# Patient Record
Sex: FEMALE | Race: WHITE | Employment: OTHER | URBAN - METROPOLITAN AREA
[De-identification: names, ages, dates, MRNs, and addresses within clinical notes are randomized per-mention and may not be internally consistent; named-entity substitution may affect disease eponyms.]

---

## 2018-09-27 ENCOUNTER — HOSPITAL ENCOUNTER (OUTPATIENT)
Age: 68
Discharge: HOME | End: 2018-09-29
Payer: MEDICARE

## 2018-09-27 PROCEDURE — 87088 URINE BACTERIA CULTURE: CPT

## 2018-09-27 PROCEDURE — 87186 SC STD MICRODIL/AGAR DIL: CPT

## 2018-09-29 LAB
ORGANISM: ABNORMAL
URINE CULTURE, ROUTINE: ABNORMAL
URINE CULTURE, ROUTINE: ABNORMAL

## 2019-06-21 ENCOUNTER — HOSPITAL ENCOUNTER (OUTPATIENT)
Age: 69
Discharge: HOME | End: 2019-06-23
Payer: MEDICARE

## 2019-06-21 PROCEDURE — 87088 URINE BACTERIA CULTURE: CPT

## 2019-06-21 PROCEDURE — 87186 SC STD MICRODIL/AGAR DIL: CPT

## 2019-06-21 PROCEDURE — 87077 CULTURE AEROBIC IDENTIFY: CPT

## 2019-06-23 LAB
ORGANISM: ABNORMAL
URINE CULTURE, ROUTINE: ABNORMAL
URINE CULTURE, ROUTINE: ABNORMAL

## 2020-01-20 ENCOUNTER — HOSPITAL ENCOUNTER (OUTPATIENT)
Dept: DIABETES SERVICES | Age: 70
Setting detail: THERAPIES SERIES
Discharge: HOME | End: 2020-01-20
Payer: MEDICARE

## 2020-01-20 PROCEDURE — G0109 DIAB MANAGE TRN IND/GROUP: HCPCS | Performed by: DIETITIAN, REGISTERED

## 2020-01-21 ENCOUNTER — HOSPITAL ENCOUNTER (OUTPATIENT)
Dept: DIABETES SERVICES | Age: 70
Setting detail: THERAPIES SERIES
Discharge: HOME | End: 2020-01-21
Payer: MEDICARE

## 2020-01-21 PROCEDURE — G0109 DIAB MANAGE TRN IND/GROUP: HCPCS

## 2020-01-21 NOTE — PROGRESS NOTES
Diabetes Self-Management Education Record    Participant Name: Richard Long  Referring Provider: Mohit Jeffery MD  Assessment/Evaluation Ratings:  1=Needs Instruction   4=Demonstrates Understanding/Competency  2=Needs Review   NC=Not Covered    3=Comprehends Key Points  N/A=Not Applicable  Topics/Learning Objectives Pre-session Assess Date:  1/20/20 PC Instr. Date Reinforce Date Post- session Eval Comments   Diabetes disease process & Treatment process: Define diabetes & prediabetes; Identify own type of diabetes; role of the pancreas; signs/symptoms; diagnostic criteria; prevention & treatment options; contributing factors. 1 1/20/20 PC  3 1/20/20 PC    New onset Type 2 DM       Incorporating nutritional management into lifestyle: Describe effect of type, amount & timing of food on blood glucose; Describe basic meal planning techniques & current nutrition guidelines;Correctly read food labels & demonstrate CHO counting & portion control with personalized meal plan. Identify dining out strategies, & dietary sick day guidelines. Incorporating physical activity into lifestyle:   Verbalize effect of exercise on blood glucose levels; benefits of regular exercise; safety considerations; contraindications; maintenance of activity. 1 1/20/20 PC  3 1/20/20 PC  Walking and yard work    3-5 times a week    30 minute sessions   Using medications safely:  Identify effects of diabetes medicines on blood glucose levels; List diabetes medication taken, action & side effects; appropriate injection sites; proper storage; supplies needed; proper technique; safe needle disposal guidelines. 1 1/20/20 PC  1/21/20 PC  3 1/20/20 PC    Metformin 500 mg with breakfast and supper   Monitoring blood glucose, interpreting and using results:  Identify recommended & personal blood glucose targets; importance of testing; testing supplies; HgbA1C target levels; Factors affecting blood glucose;  Importance of logging blood glucose levels for pattern recognition; ketone testing; safe lancet disposal. 1 1/20/20 PC  3 1/20/20 PC    Not monitoring at this time   Prevention, detection & treatment of acute complications:  Identify symptoms of hyper & hypoglycemia, and prevention & treatment strategies. Describe sick day guidelines & indications for ketone testing & physician notification. Identify short term consequences of poor control. 1 1/20/20 PC  3 1/20/20    Prevention, detection & treatment of chronic complications:  Define the natural course of diabetes & describe the relationship of blood glucose levels to long term complications of diabetes. Identify preventative measures & standards of care. 1 1/20/20 PC  3 1/20/20 PC    Hypertension   Developing strategies to address psychosocial issues:  Describe feelings about living with diabetes; Describe how stress, depression & anxiety affect blood glucose; Identify coping strategies; Identify support needed & support network available. 1 1/20/20 PC  3 PHQ-9 Depression Screen Score: 2     Developing strategies to promote health/change behavior: Identify 7 self-care behaviors; Personal health risk factors; Benefits, challenges & strategies for behavioral change; Individualized goal selection.  1 1/20/20 PC  3 Goal:I will follow my meal plan and measure my carbohydrate foods     Identified Barriers to learning/adherence to self management plan:    None    Instruction Method:  Lecture/Discussion, Power Point Presentation , Handouts and Return demonstration     Education Materials/Equipment Provided: Educational Binder, Meal Plan and Nutritional Packet       Encounter Type Date Attended Start Time End Time Comments No Show Dates   Assessment 1/20/20 PC     Reviewed in person    Session 1 1/20/20 PC        Session 2 1/21/20 PC       Session 3         Session 4         Gestational Diabetes         Individual MNT        Meter Instrx        Insulin Instrx            Additional Comments:    JAYLEN Follow-up plan/Date: 4/2020  Contact Post Class Regarding:   HgbA1C   Weight   Hypertension  Follow-up with Physician  Medication compliance   Plate method/meal plan compliance   Self-Foot Exam Frequency   Monitoring Frequency   Exercise Routine   Goal Attainment  Personal Support Plan:      [x] Keep all scheduled doctor appointments   [] Make and keep appointments with specialists (foot, eye, dentist) as recommended   [] Consult my pharmacist about all new medications or to ask any medication questions   [] Get tested for sleep apnea   [] Seek help for:   [] Make an appointment with:   [] Attend smoking cessation classes or call 1-800-QUIT-NOW  [] Attend Diabetes Support Group   [x] Use diabetes magazines, books, or credible web-sites like the ADA for more information  [] Increase exercise at home or join an exercise program:   [] Other:

## 2020-01-21 NOTE — LETTER
[facility])- Diabetes Education    2020       Re:     Henrietta Oliveira         :  1950  Dear Chaim Mcneil: Thank you for referring your patient, Henrietta Oliveira, for diabetes education sessions.     Your patient attended classes the week of 20, that addressed the following topics:    Nursing/Medical [x]     Nutrition [x]  · Describing the diabetes disease process/ treatment options  · Incorporating physical activity into lifestyle  · Using medication safely & for maximum therapeutic effectiveness  · Monitoring blood glucose & other parameters:  Interpreting and using results for self-management & decision making  · Preventing, detecting, & treating acute complications  · Preventing, detecting & treating chronic complications  · Developing personal strategies to address psychosocial issues and concerns  · Developing personal strategies to promote health & behavior change (risk reduction) · Incorporating nutrition management into lifestyle    · Nutrition for diabetes prevention & diabetes  · Relationship among nutrition, exercise, medication & blood glucose levels  · Food Groups/carbohydrate & non- carbohydrate foods  · Whole grain/high fiber foods & needs  · Fluid needs   · Label reading  · Carbohydrate consistent meal planning/ techniques  · Weighing/measuring portions  · Heart healthy guidelines: fat, sodium & cholesterol  · Alcohol  · Free foods/nutritive and non-nutritive sweeteners  · Dining out tips and recipe modification guidelines  · Sick day guidelines     [x]  Instructed on carbohydrate consistent meal plan with  1400 calories, and the following number of carbohydrate servings per meal or snack (15 gm/serving):   Breakfast:  2,  Lunch: 2, Dinner:  2     AM Snack:  0,  PM Snack:  1,  HS Snack:  2      Upon completion of these sessions the diabetes team made the following evaluation of your patients progress:  [x] Attended class alone [] Attended classes accompanied by       family/friend/significant other  [x] Very attentive to teaching  [x] Actively participated in class               discussions   [x] Answered questions appropriately       when asked   [x] Seems able to apply class concepts   to daily lifestyle  [] Had difficulty relating class information to daily lifestyle  [x] Seems motivated to do well [x] Participated in [place] nutrition              session  [x] Able to identify proper food choices      and diet changes  [] Unable to identify proper food choices  [x] Verbalizes an understanding of meal       plan  [x] Expresses an intent to comply with            meal plan  [] Refused to participate in  [place]         nutrition session  [] Worked out meal timing adjustment            according to work/schedule/lifestyle     PHQ-9 Depression Screen Score:  2  0-4: Minimal Depression                5-9: Mild Depression    10-14: Moderate Depression  15-19: Moderately Severe Depression  20-27: Severe Depression     COMMENTS:      PATIENT SELECTED GOAL:I will follow my meal plan and measure my carbohydate foods. DIABETES SELF-MANAGEMENT SUPPORT PLAN/REFERRALS  (patient identified):  [x] Keep my scheduled visits with my doctor   [] Make and keep appointments with specialists (foot, eye, dentist) as recommended  [] Consult my pharmacist with all new medications and/or any medication questions  [] Get tested for sleep apnea  [] Seek help for:   [] Make an appointment with:   [] Attend smoking cessation classes or call help-line (2-581-QUIT-NOW; 981.455.9223)  [] Attend a diabetes support group  [x] Use diabetes magazines, books, or the American Diabetes Association website (www.diabetes. org) for more information    [] Start or increase exercising at home or join a program:  [] Other: There will be a follow-up in 3 months to evaluate A1C, carbohydrate recall, attainment of their chosen goal, and self identified support plan.

## 2020-01-21 NOTE — PROGRESS NOTES
Diabetes Self-Management Education Record    Participant Name: Marjorie Preciado  Referring Provider: Marilu Hinojosa MD  Assessment/Evaluation Ratings:  1=Needs Instruction   4=Demonstrates Understanding/Competency  2=Needs Review   NC=Not Covered    3=Comprehends Key Points  N/A=Not Applicable  Topics/Learning Objectives Pre-session Assess Date:  1/20/20 PC Instr. Date Reinforce Date Post- session Eval Comments   Diabetes disease process & Treatment process: Define diabetes & prediabetes; Identify own type of diabetes; role of the pancreas; signs/symptoms; diagnostic criteria; prevention & treatment options; contributing factors. 1 1/20/20 PC  3 1/20/20 PC    New onset Type 2 DM       Incorporating nutritional management into lifestyle: Describe effect of type, amount & timing of food on blood glucose; Describe basic meal planning techniques & current nutrition guidelines;Correctly read food labels & demonstrate CHO counting & portion control with personalized meal plan. Identify dining out strategies, & dietary sick day guidelines. Incorporating physical activity into lifestyle:   Verbalize effect of exercise on blood glucose levels; benefits of regular exercise; safety considerations; contraindications; maintenance of activity. 1 1/20/20 PC  3 1/20/20 PC  Walking and yard work    3-5 times a week    30 minute sessions   Using medications safely:  Identify effects of diabetes medicines on blood glucose levels; List diabetes medication taken, action & side effects; appropriate injection sites; proper storage; supplies needed; proper technique; safe needle disposal guidelines. 1 1/20/20 PC  1/21/20 PC  3 1/20/20 PC    Metformin 500 mg with breakfast and supper   Monitoring blood glucose, interpreting and using results:  Identify recommended & personal blood glucose targets; importance of testing; testing supplies; HgbA1C target levels; Factors affecting blood glucose;  Importance of logging blood glucose levels for pattern recognition; ketone testing; safe lancet disposal. 1 1/20/20 PC  3 1/20/20 PC    Not monitoring at this time   Prevention, detection & treatment of acute complications:  Identify symptoms of hyper & hypoglycemia, and prevention & treatment strategies. Describe sick day guidelines & indications for ketone testing & physician notification. Identify short term consequences of poor control. 1 1/20/20 PC  3 1/20/20    Prevention, detection & treatment of chronic complications:  Define the natural course of diabetes & describe the relationship of blood glucose levels to long term complications of diabetes. Identify preventative measures & standards of care. 1 1/20/20 PC  3 1/20/20 PC    Hypertension   Developing strategies to address psychosocial issues:  Describe feelings about living with diabetes; Describe how stress, depression & anxiety affect blood glucose; Identify coping strategies; Identify support needed & support network available. 1 1/20/20 PC  3 PHQ-9 Depression Screen Score: 2     Developing strategies to promote health/change behavior: Identify 7 self-care behaviors; Personal health risk factors; Benefits, challenges & strategies for behavioral change; Individualized goal selection.  1 1/20/20 PC  3 Goal:I will follow my meal plan and measure my carbohydrate foods     Identified Barriers to learning/adherence to self management plan:    None    Instruction Method:  Lecture/Discussion, Power Point Presentation , Handouts and Return demonstration     Education Materials/Equipment Provided: Educational Binder, Meal Plan and Nutritional Packet       Encounter Type Date Attended Start Time End Time Comments No Show Dates   Assessment 1/20/20 PC     Reviewed in person    Session 1 1/20/20 PC        Session 2 1/21/20 PC       Session 3         Session 4         Gestational Diabetes         Individual MNT        Meter Instrx        Insulin Instrx            Additional Comments:    JAYLEN Follow-up plan/Date: 4/2020  Contact Post Class Regarding:   HgbA1C   Weight   Hypertension  Follow-up with Physician  Medication compliance   Plate method/meal plan compliance   Self-Foot Exam Frequency   Monitoring Frequency   Exercise Routine   Goal Attainment  Personal Support Plan:      [x] Keep all scheduled doctor appointments   [] Make and keep appointments with specialists (foot, eye, dentist) as recommended   [] Consult my pharmacist about all new medications or to ask any medication questions   [] Get tested for sleep apnea   [] Seek help for:   [] Make an appointment with:   [] Attend smoking cessation classes or call 1-800-QUIT-NOW  [] Attend Diabetes Support Group   [x] Use diabetes magazines, books, or credible web-sites like the ADA for more information  [] Increase exercise at home or join an exercise program:   [] Other:

## 2020-01-22 ENCOUNTER — HOSPITAL ENCOUNTER (OUTPATIENT)
Dept: DIABETES SERVICES | Age: 70
Setting detail: THERAPIES SERIES
Discharge: HOME | End: 2020-01-22
Payer: MEDICARE

## 2020-01-22 PROCEDURE — G0109 DIAB MANAGE TRN IND/GROUP: HCPCS

## 2020-01-22 NOTE — PROGRESS NOTES
Barriers to learning/adherence to self management plan:    None    Instruction Method:  Lecture/Discussion, Power Point Presentation , Handouts and Return demonstration     Education Materials/Equipment Provided: Educational Binder, Meal Plan and Nutritional Packet       Encounter Type Date Attended Start Time End Time Comments No Show Dates   Assessment 1/20/20 PC     Reviewed in person    Session 1 1/20/20 PC        Session 2 1/21/20 PC/ 900 36     Session 3         Session 4         Gestational Diabetes         Individual MNT        Meter Instrx        Insulin Instrx            Additional Comments:    [facility] Follow-up plan/Date: 4/2020  Contact Post Class Regarding:   HgbA1C   Weight   Hypertension  Follow-up with Physician  Medication compliance   Plate method/meal plan compliance   Self-Foot Exam Frequency   Monitoring Frequency   Exercise Routine   Goal Attainment  Personal Support Plan:      [x] Keep all scheduled doctor appointments   [] Make and keep appointments with specialists (foot, eye, dentist) as recommended   [] Consult my pharmacist about all new medications or to ask any medication questions   [] Get tested for sleep apnea   [] Seek help for:   [] Make an appointment with:   [] Attend smoking cessation classes or call 1-800-QUIT-NOW  [] Attend Diabetes Support Group   [x] Use diabetes magazines, books, or credible web-sites like the ADA for more information  [] Increase exercise at home or join an exercise program:   [] Other:

## 2020-01-23 NOTE — PROGRESS NOTES
1/20/20 PC            1/21/20 [facility]  3            3 1/20/20   Walking and yard work    3-5 times a week    30 minute sessions  1/21/20 Motion recommendations reviewed as well as handouts provided for different types of exercise. Barriers to exercise addressed with group discussion on ways to \"break the barrier\" of not including more motion into day. Using medications safely:  Identify effects of diabetes medicines on blood glucose levels; List diabetes medication taken, action & side effects; appropriate injection sites; proper storage; supplies needed; proper technique; safe needle disposal guidelines. 1 1/20/20 PC  1/21/20 PC  3 1/20/20     Metformin 500 mg with breakfast and supper   Monitoring blood glucose, interpreting and using results:  Identify recommended & personal blood glucose targets; importance of testing; testing supplies; HgbA1C target levels; Factors affecting blood glucose; Importance of logging blood glucose levels for pattern recognition; ketone testing; safe lancet disposal. 1 1/20/20 PC  3 1/20/20 PC    Not monitoring at this time   Prevention, detection & treatment of acute complications:  Identify symptoms of hyper & hypoglycemia, and prevention & treatment strategies. Describe sick day guidelines & indications for ketone testing & physician notification. Identify short term consequences of poor control. 1 1/20/20 PC  3 1/20/20    Prevention, detection & treatment of chronic complications:  Define the natural course of diabetes & describe the relationship of blood glucose levels to long term complications of diabetes. Identify preventative measures & standards of care. 1 1/20/20 PC  3 1/20/20     Hypertension   Developing strategies to address psychosocial issues:  Describe feelings about living with diabetes; Describe how stress, depression & anxiety affect blood glucose; Identify coping strategies; Identify support needed & support network available.  1 1/20/20 PC  3 PHQ-9

## 2020-10-06 ENCOUNTER — HOSPITAL ENCOUNTER (OUTPATIENT)
Age: 70
Discharge: HOME | End: 2020-10-06
Payer: MEDICARE

## 2020-10-06 LAB — POTASSIUM SERPL-SCNC: 4.2 MMOL/L (ref 3.5–5)

## 2020-10-06 PROCEDURE — 84132 ASSAY OF SERUM POTASSIUM: CPT

## 2020-10-06 PROCEDURE — 36415 COLL VENOUS BLD VENIPUNCTURE: CPT

## 2020-10-20 ENCOUNTER — HOSPITAL ENCOUNTER (OUTPATIENT)
Age: 70
Discharge: HOME | End: 2020-10-22
Payer: COMMERCIAL

## 2020-10-20 PROCEDURE — 87077 CULTURE AEROBIC IDENTIFY: CPT

## 2020-10-20 PROCEDURE — 87186 SC STD MICRODIL/AGAR DIL: CPT

## 2020-10-20 PROCEDURE — 87088 URINE BACTERIA CULTURE: CPT

## 2020-10-22 LAB
ORGANISM: ABNORMAL
URINE CULTURE, ROUTINE: ABNORMAL

## 2022-08-22 ENCOUNTER — HOSPITAL ENCOUNTER (INPATIENT)
Age: 72
LOS: 3 days | Discharge: HOME | DRG: 854 | End: 2022-08-25
Attending: STUDENT IN AN ORGANIZED HEALTH CARE EDUCATION/TRAINING PROGRAM | Admitting: STUDENT IN AN ORGANIZED HEALTH CARE EDUCATION/TRAINING PROGRAM
Payer: MEDICARE

## 2022-08-22 ENCOUNTER — APPOINTMENT (OUTPATIENT)
Dept: CT IMAGING | Age: 72
DRG: 854 | End: 2022-08-22
Payer: MEDICARE

## 2022-08-22 DIAGNOSIS — N13.30 HYDRONEPHROSIS, UNSPECIFIED HYDRONEPHROSIS TYPE: ICD-10-CM

## 2022-08-22 DIAGNOSIS — N20.1 URETEROLITHIASIS: ICD-10-CM

## 2022-08-22 DIAGNOSIS — N30.01 ACUTE CYSTITIS WITH HEMATURIA: ICD-10-CM

## 2022-08-22 DIAGNOSIS — N17.9 AKI (ACUTE KIDNEY INJURY) (HCC): ICD-10-CM

## 2022-08-22 DIAGNOSIS — R74.01 TRANSAMINITIS: Primary | ICD-10-CM

## 2022-08-22 DIAGNOSIS — R10.9 RIGHT FLANK PAIN: ICD-10-CM

## 2022-08-22 DIAGNOSIS — D72.829 LEUKOCYTOSIS, UNSPECIFIED TYPE: ICD-10-CM

## 2022-08-22 LAB
ALBUMIN SERPL-MCNC: 3.7 G/DL (ref 3.5–5.2)
ALP BLD-CCNC: 132 U/L (ref 35–104)
ALT SERPL-CCNC: 17 U/L (ref 0–32)
ANION GAP SERPL CALCULATED.3IONS-SCNC: 15 MMOL/L (ref 7–16)
AST SERPL-CCNC: 19 U/L (ref 0–31)
BACTERIA: ABNORMAL /HPF
BASOPHILS ABSOLUTE: 0.03 E9/L (ref 0–0.2)
BASOPHILS RELATIVE PERCENT: 0.2 % (ref 0–2)
BILIRUB SERPL-MCNC: 2.1 MG/DL (ref 0–1.2)
BILIRUBIN URINE: NEGATIVE
BLOOD, URINE: ABNORMAL
BUN BLDV-MCNC: 23 MG/DL (ref 6–23)
CALCIUM SERPL-MCNC: 9.8 MG/DL (ref 8.6–10.2)
CHLORIDE BLD-SCNC: 94 MMOL/L (ref 98–107)
CLARITY: ABNORMAL
CO2: 21 MMOL/L (ref 22–29)
COLOR: YELLOW
CREAT SERPL-MCNC: 1.4 MG/DL (ref 0.5–1)
EOSINOPHILS ABSOLUTE: 0.03 E9/L (ref 0.05–0.5)
EOSINOPHILS RELATIVE PERCENT: 0.2 % (ref 0–6)
EPITHELIAL CELLS, UA: ABNORMAL /HPF
GFR AFRICAN AMERICAN: 45
GFR NON-AFRICAN AMERICAN: 37 ML/MIN/1.73
GLUCOSE BLD-MCNC: 170 MG/DL (ref 74–99)
GLUCOSE URINE: NEGATIVE MG/DL
HCT VFR BLD CALC: 38.5 % (ref 34–48)
HEMOGLOBIN: 12.6 G/DL (ref 11.5–15.5)
IMMATURE GRANULOCYTES #: 0.18 E9/L
IMMATURE GRANULOCYTES %: 1 % (ref 0–5)
KETONES, URINE: 40 MG/DL
LACTIC ACID: 1 MMOL/L (ref 0.5–2.2)
LEUKOCYTE ESTERASE, URINE: ABNORMAL
LYMPHOCYTES ABSOLUTE: 1.56 E9/L (ref 1.5–4)
LYMPHOCYTES RELATIVE PERCENT: 8.4 % (ref 20–42)
MCH RBC QN AUTO: 30.3 PG (ref 26–35)
MCHC RBC AUTO-ENTMCNC: 32.7 % (ref 32–34.5)
MCV RBC AUTO: 92.5 FL (ref 80–99.9)
MONOCYTES ABSOLUTE: 1.03 E9/L (ref 0.1–0.95)
MONOCYTES RELATIVE PERCENT: 5.5 % (ref 2–12)
NEUTROPHILS ABSOLUTE: 15.79 E9/L (ref 1.8–7.3)
NEUTROPHILS RELATIVE PERCENT: 84.7 % (ref 43–80)
NITRITE, URINE: POSITIVE
PDW BLD-RTO: 12.5 FL (ref 11.5–15)
PH UA: 6 (ref 5–9)
PLATELET # BLD: 407 E9/L (ref 130–450)
PMV BLD AUTO: 10.2 FL (ref 7–12)
POTASSIUM SERPL-SCNC: 3.9 MMOL/L (ref 3.5–5)
PROTEIN UA: 100 MG/DL
RBC # BLD: 4.16 E12/L (ref 3.5–5.5)
RBC UA: ABNORMAL /HPF (ref 0–2)
SODIUM BLD-SCNC: 130 MMOL/L (ref 132–146)
SPECIFIC GRAVITY UA: 1.02 (ref 1–1.03)
TOTAL PROTEIN: 8.3 G/DL (ref 6.4–8.3)
UROBILINOGEN, URINE: 0.2 E.U./DL
WBC # BLD: 18.6 E9/L (ref 4.5–11.5)
WBC UA: >20 /HPF (ref 0–5)

## 2022-08-22 PROCEDURE — 96374 THER/PROPH/DIAG INJ IV PUSH: CPT

## 2022-08-22 PROCEDURE — 99285 EMERGENCY DEPT VISIT HI MDM: CPT

## 2022-08-22 PROCEDURE — 74176 CT ABD & PELVIS W/O CONTRAST: CPT

## 2022-08-22 PROCEDURE — 36415 COLL VENOUS BLD VENIPUNCTURE: CPT

## 2022-08-22 PROCEDURE — 80053 COMPREHEN METABOLIC PANEL: CPT

## 2022-08-22 PROCEDURE — 6360000002 HC RX W HCPCS: Performed by: STUDENT IN AN ORGANIZED HEALTH CARE EDUCATION/TRAINING PROGRAM

## 2022-08-22 PROCEDURE — 2580000003 HC RX 258: Performed by: STUDENT IN AN ORGANIZED HEALTH CARE EDUCATION/TRAINING PROGRAM

## 2022-08-22 PROCEDURE — 81001 URINALYSIS AUTO W/SCOPE: CPT

## 2022-08-22 PROCEDURE — 87186 SC STD MICRODIL/AGAR DIL: CPT

## 2022-08-22 PROCEDURE — 87040 BLOOD CULTURE FOR BACTERIA: CPT

## 2022-08-22 PROCEDURE — 87088 URINE BACTERIA CULTURE: CPT

## 2022-08-22 PROCEDURE — 85025 COMPLETE CBC W/AUTO DIFF WBC: CPT

## 2022-08-22 PROCEDURE — 83605 ASSAY OF LACTIC ACID: CPT

## 2022-08-22 PROCEDURE — 87077 CULTURE AEROBIC IDENTIFY: CPT

## 2022-08-22 PROCEDURE — 96375 TX/PRO/DX INJ NEW DRUG ADDON: CPT

## 2022-08-22 PROCEDURE — 2580000003 HC RX 258

## 2022-08-22 PROCEDURE — 1200000000 HC SEMI PRIVATE

## 2022-08-22 RX ORDER — SODIUM CHLORIDE 9 MG/ML
INJECTION, SOLUTION INTRAVENOUS PRN
Status: DISCONTINUED | OUTPATIENT
Start: 2022-08-22 | End: 2022-08-25 | Stop reason: HOSPADM

## 2022-08-22 RX ORDER — SODIUM CHLORIDE 0.9 % (FLUSH) 0.9 %
10 SYRINGE (ML) INJECTION EVERY 12 HOURS SCHEDULED
Status: DISCONTINUED | OUTPATIENT
Start: 2022-08-22 | End: 2022-08-25 | Stop reason: HOSPADM

## 2022-08-22 RX ORDER — ACETAMINOPHEN 325 MG/1
650 TABLET ORAL EVERY 6 HOURS PRN
Status: DISCONTINUED | OUTPATIENT
Start: 2022-08-22 | End: 2022-08-25 | Stop reason: HOSPADM

## 2022-08-22 RX ORDER — HYDRALAZINE HYDROCHLORIDE 20 MG/ML
10 INJECTION INTRAMUSCULAR; INTRAVENOUS EVERY 4 HOURS PRN
Status: DISCONTINUED | OUTPATIENT
Start: 2022-08-22 | End: 2022-08-25 | Stop reason: HOSPADM

## 2022-08-22 RX ORDER — SENNA PLUS 8.6 MG/1
1 TABLET ORAL DAILY PRN
Status: DISCONTINUED | OUTPATIENT
Start: 2022-08-22 | End: 2022-08-25 | Stop reason: HOSPADM

## 2022-08-22 RX ORDER — LANOLIN ALCOHOL/MO/W.PET/CERES
3 CREAM (GRAM) TOPICAL NIGHTLY PRN
Status: DISCONTINUED | OUTPATIENT
Start: 2022-08-23 | End: 2022-08-25 | Stop reason: HOSPADM

## 2022-08-22 RX ORDER — AMLODIPINE BESYLATE 5 MG/1
5 TABLET ORAL DAILY
Status: DISCONTINUED | OUTPATIENT
Start: 2022-08-23 | End: 2022-08-25 | Stop reason: HOSPADM

## 2022-08-22 RX ORDER — SODIUM CHLORIDE 0.9 % (FLUSH) 0.9 %
10 SYRINGE (ML) INJECTION PRN
Status: DISCONTINUED | OUTPATIENT
Start: 2022-08-22 | End: 2022-08-25 | Stop reason: HOSPADM

## 2022-08-22 RX ORDER — ONDANSETRON 4 MG/1
4 TABLET, ORALLY DISINTEGRATING ORAL EVERY 8 HOURS PRN
Status: DISCONTINUED | OUTPATIENT
Start: 2022-08-22 | End: 2022-08-25 | Stop reason: HOSPADM

## 2022-08-22 RX ORDER — 0.9 % SODIUM CHLORIDE 0.9 %
1000 INTRAVENOUS SOLUTION INTRAVENOUS ONCE
Status: COMPLETED | OUTPATIENT
Start: 2022-08-22 | End: 2022-08-22

## 2022-08-22 RX ORDER — ENOXAPARIN SODIUM 100 MG/ML
40 INJECTION SUBCUTANEOUS DAILY
Status: DISCONTINUED | OUTPATIENT
Start: 2022-08-23 | End: 2022-08-25 | Stop reason: HOSPADM

## 2022-08-22 RX ORDER — ACETAMINOPHEN 650 MG/1
650 SUPPOSITORY RECTAL EVERY 6 HOURS PRN
Status: DISCONTINUED | OUTPATIENT
Start: 2022-08-22 | End: 2022-08-25 | Stop reason: HOSPADM

## 2022-08-22 RX ORDER — TRAMADOL HYDROCHLORIDE 50 MG/1
50 TABLET ORAL EVERY 6 HOURS PRN
Status: DISCONTINUED | OUTPATIENT
Start: 2022-08-22 | End: 2022-08-25 | Stop reason: HOSPADM

## 2022-08-22 RX ORDER — SODIUM CHLORIDE 9 MG/ML
INJECTION, SOLUTION INTRAVENOUS CONTINUOUS
Status: ACTIVE | OUTPATIENT
Start: 2022-08-22 | End: 2022-08-23

## 2022-08-22 RX ORDER — 0.9 % SODIUM CHLORIDE 0.9 %
1000 INTRAVENOUS SOLUTION INTRAVENOUS ONCE
Status: COMPLETED | OUTPATIENT
Start: 2022-08-22 | End: 2022-08-23

## 2022-08-22 RX ORDER — FENTANYL CITRATE 50 UG/ML
50 INJECTION, SOLUTION INTRAMUSCULAR; INTRAVENOUS
Status: ACTIVE | OUTPATIENT
Start: 2022-08-22 | End: 2022-08-23

## 2022-08-22 RX ORDER — ONDANSETRON 2 MG/ML
4 INJECTION INTRAMUSCULAR; INTRAVENOUS ONCE
Status: COMPLETED | OUTPATIENT
Start: 2022-08-22 | End: 2022-08-22

## 2022-08-22 RX ORDER — KETOROLAC TROMETHAMINE 30 MG/ML
15 INJECTION, SOLUTION INTRAMUSCULAR; INTRAVENOUS ONCE
Status: COMPLETED | OUTPATIENT
Start: 2022-08-22 | End: 2022-08-22

## 2022-08-22 RX ORDER — ONDANSETRON 2 MG/ML
4 INJECTION INTRAMUSCULAR; INTRAVENOUS EVERY 6 HOURS PRN
Status: DISCONTINUED | OUTPATIENT
Start: 2022-08-22 | End: 2022-08-25 | Stop reason: HOSPADM

## 2022-08-22 RX ADMIN — SODIUM CHLORIDE 1000 ML: 9 INJECTION, SOLUTION INTRAVENOUS at 22:53

## 2022-08-22 RX ADMIN — SODIUM CHLORIDE 1000 ML: 9 INJECTION, SOLUTION INTRAVENOUS at 23:23

## 2022-08-22 RX ADMIN — ONDANSETRON 4 MG: 2 INJECTION INTRAMUSCULAR; INTRAVENOUS at 22:54

## 2022-08-22 RX ADMIN — CEFTRIAXONE 1000 MG: 1 INJECTION, POWDER, FOR SOLUTION INTRAMUSCULAR; INTRAVENOUS at 23:23

## 2022-08-22 RX ADMIN — KETOROLAC TROMETHAMINE 15 MG: 30 INJECTION, SOLUTION INTRAMUSCULAR; INTRAVENOUS at 22:54

## 2022-08-22 ASSESSMENT — PAIN DESCRIPTION - LOCATION: LOCATION: FLANK

## 2022-08-22 ASSESSMENT — PAIN DESCRIPTION - PAIN TYPE: TYPE: ACUTE PAIN

## 2022-08-22 ASSESSMENT — PAIN - FUNCTIONAL ASSESSMENT: PAIN_FUNCTIONAL_ASSESSMENT: 0-10

## 2022-08-22 ASSESSMENT — PAIN SCALES - GENERAL: PAINLEVEL_OUTOF10: 5

## 2022-08-22 ASSESSMENT — PAIN DESCRIPTION - FREQUENCY: FREQUENCY: CONTINUOUS

## 2022-08-22 NOTE — ED NOTES
Department of Emergency Medicine  FIRST PROVIDER TRIAGE NOTE             Independent MLP           8/22/22  7:11 PM EDT    Date of Encounter: 8/22/22   MRN: [id number]      HPI: Sasha Gomez is a 70 y.o. female who presents to the ED for Other (Sent in from [address] for abnormal US, + kidney stones ) and Flank Pain     Pt presenting with right flank pain, chills intermittent for the past few days. Went to urgent care and sent for abnormal US. ROS: Negative for cp or sob. PE: Gen Appearance/Constitutional: alert  HEENT: NC/NT. PERRLA,  Airway patent. Initial Plan of Care: All treatment areas with department are currently occupied. Plan to order/Initiate the following while awaiting opening in ED: labs.   Initiate Treatment-Testing, Proceed toTreatment Area When Bed Available for ED Attending/MLP to Continue Care    Electronically signed by Talha Pathak PA-C   DD: 8/22/22      Talha Pathak PA-C  08/22/22 [id number]

## 2022-08-23 ENCOUNTER — ANESTHESIA (OUTPATIENT)
Dept: OPERATING ROOM | Age: 72
DRG: 854 | End: 2022-08-23
Payer: MEDICARE

## 2022-08-23 ENCOUNTER — APPOINTMENT (OUTPATIENT)
Dept: GENERAL RADIOLOGY | Age: 72
DRG: 854 | End: 2022-08-23
Payer: MEDICARE

## 2022-08-23 ENCOUNTER — ANESTHESIA EVENT (OUTPATIENT)
Dept: OPERATING ROOM | Age: 72
DRG: 854 | End: 2022-08-23
Payer: MEDICARE

## 2022-08-23 LAB
ALBUMIN SERPL-MCNC: 2.7 G/DL (ref 3.5–5.2)
ALP BLD-CCNC: 154 U/L (ref 35–104)
ALT SERPL-CCNC: 15 U/L (ref 0–32)
ANION GAP SERPL CALCULATED.3IONS-SCNC: 10 MMOL/L (ref 7–16)
AST SERPL-CCNC: 31 U/L (ref 0–31)
BASOPHILS ABSOLUTE: 0.04 E9/L (ref 0–0.2)
BASOPHILS RELATIVE PERCENT: 0.3 % (ref 0–2)
BILIRUB SERPL-MCNC: 0.7 MG/DL (ref 0–1.2)
BUN BLDV-MCNC: 22 MG/DL (ref 6–23)
C-REACTIVE PROTEIN: 27 MG/DL (ref 0–0.4)
CALCIUM SERPL-MCNC: 8.4 MG/DL (ref 8.6–10.2)
CHLORIDE BLD-SCNC: 102 MMOL/L (ref 98–107)
CO2: 22 MMOL/L (ref 22–29)
CREAT SERPL-MCNC: 1.3 MG/DL (ref 0.5–1)
CREATININE URINE: 126 MG/DL (ref 29–226)
CREATININE URINE: 130 MG/DL (ref 29–226)
EOSINOPHILS ABSOLUTE: 0.08 E9/L (ref 0.05–0.5)
EOSINOPHILS RELATIVE PERCENT: 0.5 % (ref 0–6)
GFR AFRICAN AMERICAN: 49
GFR NON-AFRICAN AMERICAN: 40 ML/MIN/1.73
GLUCOSE BLD-MCNC: 154 MG/DL (ref 74–99)
HCT VFR BLD CALC: 32.7 % (ref 34–48)
HEMOGLOBIN: 10.4 G/DL (ref 11.5–15.5)
IMMATURE GRANULOCYTES #: 0.12 E9/L
IMMATURE GRANULOCYTES %: 0.8 % (ref 0–5)
LYMPHOCYTES ABSOLUTE: 1.52 E9/L (ref 1.5–4)
LYMPHOCYTES RELATIVE PERCENT: 10.3 % (ref 20–42)
MCH RBC QN AUTO: 30.1 PG (ref 26–35)
MCHC RBC AUTO-ENTMCNC: 31.8 % (ref 32–34.5)
MCV RBC AUTO: 94.8 FL (ref 80–99.9)
METER GLUCOSE: 109 MG/DL (ref 74–99)
METER GLUCOSE: 188 MG/DL (ref 74–99)
MONOCYTES ABSOLUTE: 1.01 E9/L (ref 0.1–0.95)
MONOCYTES RELATIVE PERCENT: 6.8 % (ref 2–12)
NEUTROPHILS ABSOLUTE: 11.98 E9/L (ref 1.8–7.3)
NEUTROPHILS RELATIVE PERCENT: 81.3 % (ref 43–80)
PDW BLD-RTO: 12.5 FL (ref 11.5–15)
PLATELET # BLD: 304 E9/L (ref 130–450)
PMV BLD AUTO: 10.2 FL (ref 7–12)
POTASSIUM REFLEX MAGNESIUM: 4.5 MMOL/L (ref 3.5–5)
PROCALCITONIN: 4.19 NG/ML (ref 0–0.08)
PROTEIN PROTEIN: 88 MG/DL (ref 0–12)
PROTEIN/CREAT RATIO: 0.7
PROTEIN/CREAT RATIO: 0.7 (ref 0–0.2)
RBC # BLD: 3.45 E12/L (ref 3.5–5.5)
SEDIMENTATION RATE, ERYTHROCYTE: 123 MM/HR (ref 0–20)
SODIUM BLD-SCNC: 134 MMOL/L (ref 132–146)
SODIUM URINE: 50 MMOL/L
TOTAL PROTEIN: 6.7 G/DL (ref 6.4–8.3)
UREA NITROGEN, UR: 688 MG/DL (ref 800–1666)
WBC # BLD: 14.8 E9/L (ref 4.5–11.5)

## 2022-08-23 PROCEDURE — 3700000000 HC ANESTHESIA ATTENDED CARE: Performed by: UROLOGY

## 2022-08-23 PROCEDURE — 87088 URINE BACTERIA CULTURE: CPT

## 2022-08-23 PROCEDURE — 7100000010 HC PHASE II RECOVERY - FIRST 15 MIN: Performed by: UROLOGY

## 2022-08-23 PROCEDURE — 6370000000 HC RX 637 (ALT 250 FOR IP): Performed by: UROLOGY

## 2022-08-23 PROCEDURE — 7100000011 HC PHASE II RECOVERY - ADDTL 15 MIN: Performed by: UROLOGY

## 2022-08-23 PROCEDURE — 1200000000 HC SEMI PRIVATE

## 2022-08-23 PROCEDURE — 74420 UROGRAPHY RTRGR +-KUB: CPT

## 2022-08-23 PROCEDURE — 84540 ASSAY OF URINE/UREA-N: CPT

## 2022-08-23 PROCEDURE — 82570 ASSAY OF URINE CREATININE: CPT

## 2022-08-23 PROCEDURE — 3600000013 HC SURGERY LEVEL 3 ADDTL 15MIN: Performed by: UROLOGY

## 2022-08-23 PROCEDURE — 3700000001 HC ADD 15 MINUTES (ANESTHESIA): Performed by: UROLOGY

## 2022-08-23 PROCEDURE — 85025 COMPLETE CBC W/AUTO DIFF WBC: CPT

## 2022-08-23 PROCEDURE — C2617 STENT, NON-COR, TEM W/O DEL: HCPCS | Performed by: UROLOGY

## 2022-08-23 PROCEDURE — 36415 COLL VENOUS BLD VENIPUNCTURE: CPT

## 2022-08-23 PROCEDURE — 84156 ASSAY OF PROTEIN URINE: CPT

## 2022-08-23 PROCEDURE — 85651 RBC SED RATE NONAUTOMATED: CPT

## 2022-08-23 PROCEDURE — C1758 CATHETER, URETERAL: HCPCS | Performed by: UROLOGY

## 2022-08-23 PROCEDURE — 6360000002 HC RX W HCPCS: Performed by: UROLOGY

## 2022-08-23 PROCEDURE — BT141ZZ FLUOROSCOPY OF KIDNEYS, URETERS AND BLADDER USING LOW OSMOLAR CONTRAST: ICD-10-PCS | Performed by: UROLOGY

## 2022-08-23 PROCEDURE — 80053 COMPREHEN METABOLIC PANEL: CPT

## 2022-08-23 PROCEDURE — 6360000002 HC RX W HCPCS: Performed by: NURSE ANESTHETIST, CERTIFIED REGISTERED

## 2022-08-23 PROCEDURE — 86140 C-REACTIVE PROTEIN: CPT

## 2022-08-23 PROCEDURE — 2580000003 HC RX 258: Performed by: NURSE ANESTHETIST, CERTIFIED REGISTERED

## 2022-08-23 PROCEDURE — 6360000004 HC RX CONTRAST MEDICATION: Performed by: UROLOGY

## 2022-08-23 PROCEDURE — 82962 GLUCOSE BLOOD TEST: CPT

## 2022-08-23 PROCEDURE — 3600000003 HC SURGERY LEVEL 3 BASE: Performed by: UROLOGY

## 2022-08-23 PROCEDURE — 2580000003 HC RX 258: Performed by: STUDENT IN AN ORGANIZED HEALTH CARE EDUCATION/TRAINING PROGRAM

## 2022-08-23 PROCEDURE — 2580000003 HC RX 258: Performed by: UROLOGY

## 2022-08-23 PROCEDURE — 0T768DZ DILATION OF RIGHT URETER WITH INTRALUMINAL DEVICE, VIA NATURAL OR ARTIFICIAL OPENING ENDOSCOPIC: ICD-10-PCS | Performed by: STUDENT IN AN ORGANIZED HEALTH CARE EDUCATION/TRAINING PROGRAM

## 2022-08-23 PROCEDURE — 2709999900 HC NON-CHARGEABLE SUPPLY: Performed by: UROLOGY

## 2022-08-23 PROCEDURE — 84300 ASSAY OF URINE SODIUM: CPT

## 2022-08-23 PROCEDURE — 84145 PROCALCITONIN (PCT): CPT

## 2022-08-23 DEVICE — URETERAL STENT
Type: IMPLANTABLE DEVICE | Status: FUNCTIONAL
Brand: PERCUFLEX™

## 2022-08-23 RX ORDER — HYDRALAZINE HYDROCHLORIDE 20 MG/ML
10 INJECTION INTRAMUSCULAR; INTRAVENOUS
Status: CANCELLED | OUTPATIENT
Start: 2022-08-23

## 2022-08-23 RX ORDER — POTASSIUM CITRATE 10 MEQ/1
10 TABLET, EXTENDED RELEASE ORAL 2 TIMES DAILY
COMMUNITY
Start: 2022-06-29

## 2022-08-23 RX ORDER — FENTANYL CITRATE 50 UG/ML
50 INJECTION, SOLUTION INTRAMUSCULAR; INTRAVENOUS EVERY 5 MIN PRN
Status: CANCELLED | OUTPATIENT
Start: 2022-08-23

## 2022-08-23 RX ORDER — THIAMINE HCL 100 MG
500 TABLET ORAL NIGHTLY
COMMUNITY

## 2022-08-23 RX ORDER — SODIUM CHLORIDE 0.9 % (FLUSH) 0.9 %
5-40 SYRINGE (ML) INJECTION EVERY 12 HOURS SCHEDULED
Status: CANCELLED | OUTPATIENT
Start: 2022-08-23

## 2022-08-23 RX ORDER — FENTANYL CITRATE 50 UG/ML
INJECTION, SOLUTION INTRAMUSCULAR; INTRAVENOUS PRN
Status: DISCONTINUED | OUTPATIENT
Start: 2022-08-23 | End: 2022-08-23 | Stop reason: SDUPTHER

## 2022-08-23 RX ORDER — SODIUM CHLORIDE 0.9 % (FLUSH) 0.9 %
5-40 SYRINGE (ML) INJECTION PRN
Status: CANCELLED | OUTPATIENT
Start: 2022-08-23

## 2022-08-23 RX ORDER — MEPERIDINE HYDROCHLORIDE 25 MG/ML
12.5 INJECTION INTRAMUSCULAR; INTRAVENOUS; SUBCUTANEOUS EVERY 5 MIN PRN
Status: CANCELLED | OUTPATIENT
Start: 2022-08-23

## 2022-08-23 RX ORDER — FENTANYL CITRATE 50 UG/ML
25 INJECTION, SOLUTION INTRAMUSCULAR; INTRAVENOUS EVERY 5 MIN PRN
Status: CANCELLED | OUTPATIENT
Start: 2022-08-23

## 2022-08-23 RX ORDER — LABETALOL HYDROCHLORIDE 5 MG/ML
10 INJECTION, SOLUTION INTRAVENOUS
Status: CANCELLED | OUTPATIENT
Start: 2022-08-23

## 2022-08-23 RX ORDER — IPRATROPIUM BROMIDE AND ALBUTEROL SULFATE 2.5; .5 MG/3ML; MG/3ML
1 SOLUTION RESPIRATORY (INHALATION)
Status: CANCELLED | OUTPATIENT
Start: 2022-08-23 | End: 2022-08-23

## 2022-08-23 RX ORDER — LOSARTAN POTASSIUM 50 MG/1
50 TABLET ORAL DAILY
COMMUNITY
Start: 2022-06-05

## 2022-08-23 RX ORDER — SODIUM CHLORIDE 9 MG/ML
INJECTION, SOLUTION INTRAVENOUS CONTINUOUS PRN
Status: DISCONTINUED | OUTPATIENT
Start: 2022-08-23 | End: 2022-08-23 | Stop reason: SDUPTHER

## 2022-08-23 RX ORDER — PROPOFOL 10 MG/ML
INJECTION, EMULSION INTRAVENOUS CONTINUOUS PRN
Status: DISCONTINUED | OUTPATIENT
Start: 2022-08-23 | End: 2022-08-23 | Stop reason: SDUPTHER

## 2022-08-23 RX ORDER — SODIUM CHLORIDE 9 MG/ML
25 INJECTION, SOLUTION INTRAVENOUS PRN
Status: CANCELLED | OUTPATIENT
Start: 2022-08-23

## 2022-08-23 RX ORDER — MIDAZOLAM HYDROCHLORIDE 2 MG/2ML
2 INJECTION, SOLUTION INTRAMUSCULAR; INTRAVENOUS
Status: CANCELLED | OUTPATIENT
Start: 2022-08-23 | End: 2022-08-23

## 2022-08-23 RX ORDER — ONDANSETRON 2 MG/ML
4 INJECTION INTRAMUSCULAR; INTRAVENOUS
Status: CANCELLED | OUTPATIENT
Start: 2022-08-23 | End: 2022-08-23

## 2022-08-23 RX ADMIN — ACETAMINOPHEN 650 MG: 325 TABLET ORAL at 21:57

## 2022-08-23 RX ADMIN — FENTANYL CITRATE 100 MCG: 50 INJECTION, SOLUTION INTRAMUSCULAR; INTRAVENOUS at 10:15

## 2022-08-23 RX ADMIN — SODIUM CHLORIDE: 9 INJECTION, SOLUTION INTRAVENOUS at 21:57

## 2022-08-23 RX ADMIN — WATER 1000 MG: 1 INJECTION INTRAMUSCULAR; INTRAVENOUS; SUBCUTANEOUS at 22:57

## 2022-08-23 RX ADMIN — SODIUM CHLORIDE: 9 INJECTION, SOLUTION INTRAVENOUS at 10:09

## 2022-08-23 RX ADMIN — PROPOFOL 100 MCG/KG/MIN: 10 INJECTION, EMULSION INTRAVENOUS at 10:15

## 2022-08-23 RX ADMIN — Medication 10 ML: at 00:19

## 2022-08-23 RX ADMIN — Medication 10 ML: at 12:36

## 2022-08-23 RX ADMIN — SODIUM CHLORIDE: 9 INJECTION, SOLUTION INTRAVENOUS at 12:39

## 2022-08-23 RX ADMIN — SODIUM CHLORIDE: 9 INJECTION, SOLUTION INTRAVENOUS at 00:46

## 2022-08-23 ASSESSMENT — ENCOUNTER SYMPTOMS
SORE THROAT: 0
SINUS PAIN: 0
COUGH: 0
SHORTNESS OF BREATH: 0
COLOR CHANGE: 0
CONSTIPATION: 0
NAUSEA: 0
DIARRHEA: 0
VOMITING: 0
EYE DISCHARGE: 0
ABDOMINAL PAIN: 1

## 2022-08-23 ASSESSMENT — PAIN SCALES - GENERAL
PAINLEVEL_OUTOF10: 0

## 2022-08-23 ASSESSMENT — LIFESTYLE VARIABLES: SMOKING_STATUS: 0

## 2022-08-23 NOTE — ED NOTES
The following labs labeled with pt sticker and tubed to lab:     [] Blue     [] Lavender   [] on ice  [] Green/yellow  [] Green/black [] on ice  [] Yellow  [] Red  [] Pink      [] COVID-19 swab    [] Rapid  [] PCR  [] Flu Swab  [] Strep Swab  [] Peds Viral Panel     [] Urine Sample  [] Pelvic Cultures  [x] Blood Cultures   [] Wound Cultures        Celso Phillips RN  08/22/22 [id number]

## 2022-08-23 NOTE — PROGRESS NOTES
Occupational Therapy    Occupational Therapy referral received.    No acute OT needs at this time  OT order discontinued

## 2022-08-23 NOTE — PROGRESS NOTES
Nursing Transfer Note    Data:  Summary of patients progress: Dr Akers Maria Isabel cysto stent  Reason for transfer: next level of care    Action:  Explained reason for transfer to Patient. Report given to: Chad Luong, using RN Handoff Navigator.   Mode of transportation: bed    Response:  RN Recommendations: shay Agee

## 2022-08-23 NOTE — PROGRESS NOTES
Physical Therapy    PT orders received. Pt reports she is independent with mobility and does not need inpatient PT services. Will discharge order. Pt instructed to notify staff if PT needs arise.

## 2022-08-23 NOTE — PROGRESS NOTES
Comprehensive Nutrition Assessment    Type and Reason for Visit:  Initial, Positive Nutrition Screen    Nutrition Recommendations/Plan:   Continue current diet as tolerated & monitor     Nutrition Assessment:    Pt w/ ureteral stone w/ hydronephrosis s/p cysto w/ stent insertion 8/23. Diet has been advanced to regular. Pt reports feeling better and declines need for an ONS at this time. Will continue to monitor while inpatient. Nutrition Related Findings:    A&O, I&Os WDL, abd WDL, hypo BS, loss of appetite- per pt feeling better now Wound Type: None       Current Nutrition Intake & Therapies:    Average Meal Intake: Unable to assess (diet upgraded at this time)  Average Supplements Intake: None Ordered  ADULT DIET; Regular    Anthropometric Measures:  Height: 5' 5\" (165.1 cm)  Ideal Body Weight (IBW): 125 lbs (57 kg)    Admission Body Weight: 183 lb (83 kg) (8/23 bed)  Current Body Weight: 183 lb (83 kg) (8/23 bed), 146.4 % IBW. Weight Source: Bed Scale  Current BMI (kg/m2): 30.5  Usual Body Weight:  (no wt hx per EMR)     Weight Adjustment For: No Adjustment                 BMI Categories: Obese Class 1 (BMI 30.0-34. 9)    Nutrition Diagnosis:   No nutrition diagnosis at this time     Nutrition Interventions:   Food and/or Nutrient Delivery: Continue Current Diet  Nutrition Education/Counseling: Education initiated (discussed appetite w/ pt)  Coordination of Nutrition Care: Continue to monitor while inpatient       Goals:     Goals: PO intake 75% or greater, by next RD assessment       Nutrition Monitoring and Evaluation:      Food/Nutrient Intake Outcomes: Food and Nutrient Intake  Physical Signs/Symptoms Outcomes: Biochemical Data, GI Status, Fluid Status or Edema, Nutrition Focused Physical Findings, Skin, Weight    Discharge Planning:     Too soon to determine     Radha Villagomez RD, LD  Contact: [phone]

## 2022-08-23 NOTE — CONSULTS
[facility]  [address] [place] [address]. Samantha Nevarez, [address] [facility]  [phone]  FAX [phone]        REASON FOR CONSULTATION:      Ureteral stone    HISTORY OF PRESENT ILLNESS:      The patient is a 70 y.o. female patient who presents with 1.5 weeks of flank pain. Known to our office with stones. Low grade fever at home. Tachycardia in ER that improved with fluids. Found to have right distal ureteral stone on CT scan. Comfortable with pain medication. Past Medical History:   Diagnosis Date    Kidney stone          Past Surgical History:   Procedure Laterality Date    APPENDECTOMY      OVARIAN CYST SURGERY      WISDOM TOOTH EXTRACTION         Medications Prior to Admission:    Medications Prior to Admission: Magnesium 500 MG TABS, Take 500 mg by mouth nightly  metFORMIN (GLUCOPHAGE) 500 MG tablet, Take 500 mg by mouth in the morning and 500 mg in the evening. potassium citrate (UROCIT-K) 10 MEQ (1080 MG) extended release tablet, Take 10 mEq by mouth in the morning and 10 mEq in the evening. losartan (COZAAR) 50 MG tablet, Take 50 mg by mouth daily  Cholecalciferol 100 MCG (4000 UT) CAPS, Take 2,000 Units by mouth daily  amLODIPine (NORVASC) 5 MG tablet, Take 1 tablet by mouth daily (Patient not taking: Reported on 8/23/2022)    Allergies:    Codeine    Social History:    reports that she has never smoked. She has never used smokeless tobacco. She reports that she does not drink alcohol and does not use drugs. Family History:   Non-contributory to this Urological problem  family history is not on file.     REVIEW OF SYSTEMS:  Respiratory: negative for cough and hemoptysis  Cardiovascular: negative for chest pain and dyspnea  Gastrointestinal: negative for abdominal pain, diarrhea, nausea and vomiting  Derm: negative for rash and skin lesion(s)  Neurological: negative for seizures and tremors  Endocrine: negative for diabetic symptoms including polydipsia and polyuria  : As above in the HPI, otherwise negative  All other systems negative    PHYSICAL EXAM:    Vitals:  /60   Pulse 88   Temp 98.6 °F (37 °C) (Oral)   Resp 16   Ht 5' 5\" (1.651 m)   Wt 183 lb 1.6 oz (83.1 kg)   SpO2 96%   BMI 30.47 kg/m²     General:  Awake, alert, oriented X 3. Well developed, well nourished, well groomed. No apparent distress. HEENT:  Normocephalic, atraumatic. Pupils equal, round. No scleral icterus. No conjunctival injection. Normal lips, teeth, and gums. No nasal discharge. Neck:  Supple, no masses. Heart:  RRR  Lungs:  No audible wheezing. Respirations symmetric and non-labored. Abdomen:  soft, nontender, no masses, no organomegaly, no peritoneal signs  Extremities:  No clubbing, cyanosis, or edema  Skin:  Warm and dry, no open lesions or rashes  Neuro:  Cranial nerves 2-12 intact, no focal deficits  Rectal: deferred  Genitalia:  deferred    LABS:    Lab Results   Component Value Date    WBC 14.8 (H) 08/23/2022    HGB 10.4 (L) 08/23/2022    HCT 32.7 (L) 08/23/2022    MCV 94.8 08/23/2022     08/23/2022       Lab Results   Component Value Date    CREATININE 1.3 (H) 08/23/2022       No results found for: PSA    Lab Results   Component Value Date    LABURIN >100,000 CFU/ml 06/03/2021       No results found for: BC    No results found for: BLOODCULT2    ASSESSMENT / PLAN:      1. 6 mm right distal ureteral stone with hydronephrosis and tachycardia that resolved. Discussed surgical intervention and will proceed to OR for cystoscopy, retrogrades and right stent insertion. Will remove stone at a later date due to risk of current infection.         Lady Jeet MD  7:21 AM  8/23/2022

## 2022-08-23 NOTE — ED PROVIDER NOTES
[place] is a 70 y.o. female with a hx of HTN. Patient is a 70 y.o. female presents with a chief complaint of hx kidney stone + UTI. This has been occurring for about 1 week. Patient states that it gets better with nothing. Patient states that it gets worse with nothing. Patient states that it is  2/10  and mild in severity. Patient states it was acute in onset. She notes about a week ago, she had a kidney stone with pain for about 2 days, but this pain improved so she did not follow-up. Over the past few days, however, she began to notice her urine becoming more cloudy and she experienced some nausea with one episode of vomiting yesterday and mild, rated 2/10 in severity RLQ abd pain. She denies any fevers, chills, dysuria, hematuria, SOB, CP, and back pain. Review of Systems   Constitutional:  Negative for chills and fever. HENT:  Negative for congestion, sinus pain and sore throat. Eyes:  Negative for discharge and visual disturbance. Respiratory:  Negative for cough and shortness of breath. Cardiovascular:  Negative for chest pain and leg swelling. Gastrointestinal:  Positive for abdominal pain. Negative for constipation, diarrhea, nausea and vomiting. Endocrine: Negative for polyuria. Genitourinary:  Negative for difficulty urinating, dysuria, frequency and hematuria. Cloudy urine   Musculoskeletal:  Negative for arthralgias and joint swelling. Skin:  Negative for color change and rash. Neurological:  Negative for dizziness, weakness, light-headedness, numbness and headaches. All other systems reviewed and are negative. Physical Exam  Constitutional:       General: She is not in acute distress. Appearance: Normal appearance. HENT:      Head: Normocephalic and atraumatic. Mouth/Throat:      Mouth: Mucous membranes are moist.      Pharynx: Oropharynx is clear. Eyes:      Extraocular Movements: Extraocular movements intact.       Conjunctiva/sclera: Conjunctivae normal.      Pupils: Pupils are equal, round, and reactive to light. Cardiovascular:      Rate and Rhythm: Normal rate and regular rhythm. Pulses: Normal pulses. Heart sounds: Normal heart sounds. Pulmonary:      Effort: Pulmonary effort is normal.      Breath sounds: Normal breath sounds. Abdominal:      General: Abdomen is flat. Palpations: Abdomen is soft. Musculoskeletal:         General: No swelling. Normal range of motion. Cervical back: Normal range of motion and neck supple. Skin:     General: Skin is warm and dry. Neurological:      General: No focal deficit present. Mental Status: She is alert and oriented to person, place, and time. Psychiatric:         Mood and Affect: Mood normal.         Behavior: Behavior normal.        Procedures     MDM  Number of Diagnoses or Management Options  Acute cystitis with hematuria  JENA (acute kidney injury) ([facility] [address]  Leukocytosis, unspecified type  Right flank pain  Ureterolithiasis  Diagnosis management comments: [place] is a 70 y.o. female with c/o UTI and kidney stone. UA demonstrates; CBC reveals a leukocytosis of 18.6; CT revealing hydronephrosis and an 8 mm stone in the right ureter. Lactic of 1.0. CMP relatively WNL. Spoke with urology who advised to place patient NPO with plans to proceed in the morning; spoke with Dr. Princess Kirkland who agreed to admit pt. Patient is agreeable to plan.                --------------------------------------------- PAST HISTORY ---------------------------------------------  Past Medical History:  has a past medical history of Kidney stone. Past Surgical History:  has a past surgical history that includes Ovarian cyst surgery; Appendectomy; and [place] tooth extraction. Social History:  reports that she has never smoked. She has never used smokeless tobacco. She reports that she does not drink alcohol and does not use drugs. Family History: family history is not on file.      The patients home medications have been reviewed.     Allergies: Codeine    -------------------------------------------------- RESULTS -------------------------------------------------    LABS:  Results for orders placed or performed during the hospital encounter of 08/22/22   CBC with Auto Differential   Result Value Ref Range    WBC 18.6 (H) 4.5 - 11.5 E9/L    RBC 4.16 3.50 - 5.50 E12/L    Hemoglobin 12.6 11.5 - 15.5 g/dL    Hematocrit 38.5 34.0 - 48.0 %    MCV 92.5 80.0 - 99.9 fL    MCH 30.3 26.0 - 35.0 pg    MCHC 32.7 32.0 - 34.5 %    RDW 12.5 11.5 - 15.0 fL    Platelets 403 590 - 920 E9/L    MPV 10.2 7.0 - 12.0 fL    Neutrophils % 84.7 (H) 43.0 - 80.0 %    Immature Granulocytes % 1.0 0.0 - 5.0 %    Lymphocytes % 8.4 (L) 20.0 - 42.0 %    Monocytes % 5.5 2.0 - 12.0 %    Eosinophils % 0.2 0.0 - 6.0 %    Basophils % 0.2 0.0 - 2.0 %    Neutrophils Absolute 15.79 (H) 1.80 - 7.30 E9/L    Immature Granulocytes # 0.18 E9/L    Lymphocytes Absolute 1.56 1.50 - 4.00 E9/L    Monocytes Absolute 1.03 (H) 0.10 - 0.95 E9/L    Eosinophils Absolute 0.03 (L) 0.05 - 0.50 E9/L    Basophils Absolute 0.03 0.00 - 0.20 E9/L   Comprehensive Metabolic Panel   Result Value Ref Range    Sodium 130 (L) 132 - 146 mmol/L    Potassium 3.9 3.5 - 5.0 mmol/L    Chloride 94 (L) 98 - 107 mmol/L    CO2 21 (L) 22 - 29 mmol/L    Anion Gap 15 7 - 16 mmol/L    Glucose 170 (H) 74 - 99 mg/dL    BUN 23 6 - 23 mg/dL    Creatinine 1.4 (H) 0.5 - 1.0 mg/dL    GFR Non-African American 37 >=60 mL/min/1.73    GFR African American 45     Calcium 9.8 8.6 - 10.2 mg/dL    Total Protein 8.3 6.4 - 8.3 g/dL    Albumin 3.7 3.5 - 5.2 g/dL    Total Bilirubin 2.1 (H) 0.0 - 1.2 mg/dL    Alkaline Phosphatase 132 (H) 35 - 104 U/L    ALT 17 0 - 32 U/L    AST 19 0 - 31 U/L   Lactic Acid   Result Value Ref Range    Lactic Acid 1.0 0.5 - 2.2 mmol/L   Urinalysis   Result Value Ref Range    Color, UA Yellow Straw/Yellow    Clarity, UA CLOUDY (A) Clear    Glucose, Ur Negative Negative mg/dL    Bilirubin Urine Negative Negative    Ketones, Urine 40 (A) Negative mg/dL    Specific Gravity, UA 1.025 1.005 - 1.030    Blood, Urine MODERATE (A) Negative    pH, UA 6.0 5.0 - 9.0    Protein,  (A) Negative mg/dL    Urobilinogen, Urine 0.2 <2.0 E.U./dL    Nitrite, Urine POSITIVE (A) Negative    Leukocyte Esterase, Urine LARGE (A) Negative   Microscopic Urinalysis   Result Value Ref Range    WBC, UA >20 (A) 0 - 5 /HPF    RBC, UA 5-10 (A) 0 - 2 /HPF    Epithelial Cells, UA RARE /HPF    Bacteria, UA MANY (A) None Seen /HPF       RADIOLOGY:  CT ABDOMEN PELVIS WO CONTRAST Additional Contrast? None   Final Result   Multiple subcentimeter calculi distal 3rd of the right ureter with the   largest measuring 8 mm and associated with severe right hydronephrosis. Multiple subcentimeter nonobstructing right renal calculi. Thickening of the proximal sigmoid colon with adjacent inflammatory stranding   worrisome for diverticulitis. Correlate with left lower quadrant pain. Hiatal hernia.             ------------------------- NURSING NOTES AND VITALS REVIEWED ---------------------------  Date / Time Roomed:  8/22/2022  9:04 PM  ED Bed Assignment:  11/11    The nursing notes within the ED encounter and vital signs as below have been reviewed.      Patient Vitals for the past 24 hrs:   BP Temp Temp src Pulse Resp SpO2 Height Weight   08/22/22 2320 107/64 -- -- (!) 104 14 99 % -- --   08/22/22 1911 (!) 154/137 98.2 °F (36.8 °C) Temporal (!) 114 16 96 % 5' 5\" (1.651 m) 177 lb (80.3 kg)       Oxygen Saturation Interpretation: Normal    ------------------------------------------ PROGRESS NOTES ------------------------------------------  Re-evaluation(s):  Time: 2330  Patients symptoms show no change  Repeat physical examination is not changed    Counseling:  I have spoken with the patient and discussed todays results, in addition to providing specific details for the plan of care and counseling regarding the diagnosis and prognosis. Their questions are answered at this time and they are agreeable with the plan of admission.    --------------------------------- ADDITIONAL PROVIDER NOTES ---------------------------------  Consultations:  Time: 2330. Spoke with Dr. Madai Ladd. Discussed case. They will admit the patient. This patient's ED course included: a personal history and physicial examination, re-evaluation prior to disposition, and IV medications    This patient has remained hemodynamically stable during their ED course. Diagnosis:  1. Ureterolithiasis    2. Acute cystitis with hematuria    3. Right flank pain    4. Leukocytosis, unspecified type    5. JENA (acute kidney injury) ([facility] [address]        Disposition:  Patient's disposition: Admit to telemetry  Patient's condition is stable. Patient was seen and evaluated by myself and my attending Kayy Regan DO. Assessment and Plan discussed with attending provider, please see attestation for final plan of care. This note was done using dictation software and there may be some grammatical errors associated with this.     Lashonda Mcclain 57, DO  Resident  08/23/22 [id number]

## 2022-08-23 NOTE — ANESTHESIA POSTPROCEDURE EVALUATION
Department of Anesthesiology  Postprocedure Note    Patient: Елена Rudd  MRN: [id number]  YOB: 1950  Date of evaluation: 8/23/2022      Procedure Summary     Date: 08/23/22 Room / Location: [facility] OR 06 / [facility]    Anesthesia Start: 1009 Anesthesia Stop: 1033    Procedure: CYSTOSCOPY RETROGRADE PYELOGRAM RIGHT STENT INSERTION (Right) Diagnosis:       Hydronephrosis, unspecified hydronephrosis type      (Hydronephrosis, unspecified hydronephrosis type [N13.30])    Surgeons: Donata Mcburney, MD Responsible Provider: Gloria Lugo MD    Anesthesia Type: MAC ASA Status: 3          Anesthesia Type: No value filed.     Forest Phase I:      Forest Phase II:        Anesthesia Post Evaluation    Patient location during evaluation: PACU  Patient participation: complete - patient participated  Level of consciousness: awake and alert  Pain score: 1  Airway patency: patent  Nausea & Vomiting: no nausea and no vomiting  Complications: no  Cardiovascular status: hemodynamically stable  Respiratory status: acceptable  Comments: MAP 62 will give 500 cc of fluid bolus

## 2022-08-23 NOTE — ANESTHESIA PRE PROCEDURE
Department of Anesthesiology  Preprocedure Note       Name:  Lucie Urbina   Age:  70 y.o.  :  1950                                          MRN:  [id number]         Date:  2022      Surgeon: Gallito Patterson):  Radha Antonio MD    Procedure: Procedure(s):  CYSTOSCOPY RETROGRADE PYELOGRAM RIGHT STENT INSERTION    Medications prior to admission:   Prior to Admission medications    Medication Sig Start Date End Date Taking? Authorizing Provider   Magnesium 500 MG TABS Take 500 mg by mouth nightly   Yes Historical Provider, MD   metFORMIN (GLUCOPHAGE) 500 MG tablet Take 500 mg by mouth in the morning and 500 mg in the evening.  22   Historical Provider, MD   potassium citrate (UROCIT-K) 10 MEQ (1080 MG) extended release tablet Take 10 mEq by mouth in the morning and 10 mEq in the evening. 22   Historical Provider, MD   losartan (COZAAR) 50 MG tablet Take 50 mg by mouth daily 22   Historical Provider, MD   Cholecalciferol 100 MCG (4000 UT) CAPS Take 2,000 Units by mouth daily    Historical Provider, MD   amLODIPine (NORVASC) 5 MG tablet Take 1 tablet by mouth daily  Patient not taking: Reported on 2022   Milton Linder MD       Current medications:    Current Facility-Administered Medications   Medication Dose Route Frequency Provider Last Rate Last Admin    0.9 % sodium chloride infusion   IntraVENous Continuous Deepak Sharif  mL/hr at 22 0046 New Bag at 22 0046    cefTRIAXone (ROCEPHIN) 1,000 mg in sterile water 10 mL IV syringe  1,000 mg IntraVENous Q24H Deepak Sharif MD        fentaNYL (SUBLIMAZE) injection 50 mcg  50 mcg IntraVENous Q2H PRN Deepak Sharif MD        traMADol Zulema Pion) tablet 50 mg  50 mg Oral Q6H PRN Deepak Sharif MD        hydrALAZINE (APRESOLINE) injection 10 mg  10 mg IntraVENous Q4H PRN Deepak Sharif MD        melatonin tablet 3 mg  3 mg Oral Nightly PRN Deepak Sharif MD        sodium chloride flush 0.9 % injection 10 mL  10 mL IntraVENous 2 times per day Orquidea Chand MD   10 mL at 08/23/22 0019    sodium chloride flush 0.9 % injection 10 mL  10 mL IntraVENous PRN Orquidea Chand MD        0.9 % sodium chloride infusion   IntraVENous PRN Orquidea Chand MD        enoxaparin (LOVENOX) injection 40 mg  40 mg SubCUTAneous Daily Orquidea Chand MD        ondansetron (ZOFRAN-ODT) disintegrating tablet 4 mg  4 mg Oral Q8H PRN Orquidea Chand MD        Or    ondansetron [facility] injection 4 mg  4 mg IntraVENous Q6H PRN Orquidea Chand MD        [facility]) tablet 8.6 mg  1 tablet Oral Daily PRN Orquidea Chand MD        acetaminophen (TYLENOL) tablet 650 mg  650 mg Oral Q6H PRN Orquidea Chand MD        Or    acetaminophen (TYLENOL) suppository 650 mg  650 mg Rectal Q6H PRN Orquidea Chand MD        amLODIPine (NORVASC) tablet 5 mg  5 mg Oral Daily Orquidea Chand MD           Allergies: Allergies   Allergen Reactions    Codeine Rash       Problem List:    Patient Active Problem List   Diagnosis Code    Chest pain R07.9    Essential hypertension I10    Hyperlipidemia E78.5    Vitamin D deficiency E55.9    Hydronephrosis N13.30       Past Medical History:        Diagnosis Date    Kidney stone        Past Surgical History:        Procedure Laterality Date    APPENDECTOMY      OVARIAN CYST SURGERY      WISDOM TOOTH EXTRACTION         Social History:    Social History     Tobacco Use    Smoking status: Never    Smokeless tobacco: Never   Substance Use Topics    Alcohol use:  No                                Counseling given: Not Answered      Vital Signs (Current):   Vitals:    08/23/22 0016 08/23/22 0348 08/23/22 0430 08/23/22 0747   BP: 125/70  125/60 (!) 121/59   Pulse: 95  88 87   Resp: 18  16 16   Temp: 98.2 °F (36.8 °C)  98.6 °F (37 °C) 98.7 °F (37.1 °C)   TempSrc: Oral  Oral Oral   SpO2: 97%  96% 94%   Weight:  183 lb 1.6 oz (83.1 kg)     Height:                                                  BP Readings from Last 3 Encounters: 08/23/22 (!) 121/59   07/21/16 153/81   07/20/16 (!) 180/98       NPO Status:                                                                                 BMI:   Wt Readings from Last 3 Encounters:   08/23/22 183 lb 1.6 oz (83.1 kg)   07/21/16 202 lb (91.6 kg)   07/20/16 202 lb (91.6 kg)     Body mass index is 30.47 kg/m². CBC:   Lab Results   Component Value Date/Time    WBC 14.8 08/23/2022 04:30 AM    RBC 3.45 08/23/2022 04:30 AM    HGB 10.4 08/23/2022 04:30 AM    HCT 32.7 08/23/2022 04:30 AM    MCV 94.8 08/23/2022 04:30 AM    RDW 12.5 08/23/2022 04:30 AM     08/23/2022 04:30 AM       CMP:   Lab Results   Component Value Date/Time     08/23/2022 04:30 AM    K 4.5 08/23/2022 04:30 AM     08/23/2022 04:30 AM    CO2 22 08/23/2022 04:30 AM    BUN 22 08/23/2022 04:30 AM    CREATININE 1.3 08/23/2022 04:30 AM    GFRAA 49 08/23/2022 04:30 AM    LABGLOM 40 08/23/2022 04:30 AM    GLUCOSE 154 08/23/2022 04:30 AM    GLUCOSE 123 05/10/2012 10:40 AM    PROT 6.7 08/23/2022 04:30 AM    CALCIUM 8.4 08/23/2022 04:30 AM    BILITOT 0.7 08/23/2022 04:30 AM    ALKPHOS 154 08/23/2022 04:30 AM    AST 31 08/23/2022 04:30 AM    ALT 15 08/23/2022 04:30 AM       POC Tests: No results for input(s): POCGLU, POCNA, POCK, POCCL, POCBUN, POCHEMO, POCHCT in the last 72 hours.     Coags:   Lab Results   Component Value Date/Time    PROTIME 11.9 07/20/2016 12:45 PM    INR 1.1 07/20/2016 12:45 PM    APTT 32.6 07/20/2016 12:45 PM       HCG (If Applicable): No results found for: PREGTESTUR, PREGSERUM, HCG, HCGQUANT     ABGs: No results found for: PHART, PO2ART, CJC0LOP, QTR1BDU, BEART, W2NZWAPN     Type & Screen (If Applicable):  No results found for: LABABO, LABRH    Drug/Infectious Status (If Applicable):  No results found for: HIV, HEPCAB    COVID-19 Screening (If Applicable): No results found for: COVID19        Anesthesia Evaluation  Patient summary reviewed no history of anesthetic complications:   Airway: Mallampati: II  TM distance: >3 FB   Neck ROM: full  Mouth opening: > = 3 FB   Dental: normal exam         Pulmonary:Negative Pulmonary ROS breath sounds clear to auscultation      (-) not a current smoker                           Cardiovascular:    (+) hypertension:, hyperlipidemia        Rhythm: regular  Rate: normal                    Neuro/Psych:   Negative Neuro/Psych ROS              GI/Hepatic/Renal:   (+) renal disease (Hydronephrosis):,           Endo/Other:    (+) DiabetesType II DM, , .                 Abdominal:   (+) obese,           Vascular: negative vascular ROS. Other Findings:           Anesthesia Plan      MAC     ASA 3       Induction: intravenous. Anesthetic plan and risks discussed with patient. Plan discussed with CRNA. Migdalia Jimenes MD   8/23/2022          Patient seen and chart reviewed. No interval change in history or exam.   Anesthesia plan discussed, risk/benefits addressed. Patient's concerns and questions answered.      NEMO Fernandez - PORFIRIO  August 23, 2022  9:41 AM

## 2022-08-23 NOTE — H&P
Internal Medicine History & Physical     Name: Apryl Antony  : 1950  Chief Complaint: Other (Sent in from [address] for abnormal US, + kidney stones ) and Flank Pain  Primary Care Physician: Axel Stone MD  Admission date: 2022  Date of service: 2022     History of Present Illness  [place] is a 70y.o. year old female who presented with a chief complaint of flank pain    70-year-old female presented to the emergency department on 22 complaining of right-sided flank pain. She was found to have an 8 mm kidney stone with hydronephrosis along with a urinary tract infection and significant leukocytosis over 18,000. Lactic acidosis is normal and was afebrile. Creatinine is elevated 1.4. Urology was contacted by the ED         Past Medical History:   Diagnosis Date    Kidney stone        Past Surgical History:   Procedure Laterality Date    APPENDECTOMY      OVARIAN CYST SURGERY      WISDOM TOOTH EXTRACTION         No family history on file. Social History  Patient lives at home . At baseline patient ambulates with out assistance   Illicit drugs: Denies   TOBACCO:   reports that she has never smoked. She has never used smokeless tobacco.  ETOH:   reports no history of alcohol use. Home Medications  Prior to Admission medications    Medication Sig Start Date End Date Taking? Authorizing Provider   Magnesium 500 MG TABS Take 500 mg by mouth nightly   Yes Historical Provider, MD   metFORMIN (GLUCOPHAGE) 500 MG tablet Take 500 mg by mouth in the morning and 500 mg in the evening.  22   Historical Provider, MD   potassium citrate (UROCIT-K) 10 MEQ (1080 MG) extended release tablet Take 10 mEq by mouth in the morning and 10 mEq in the evening. 22   Historical Provider, MD   losartan (COZAAR) 50 MG tablet Take 50 mg by mouth daily 22   Historical Provider, MD   Cholecalciferol 100 MCG (4000 UT) CAPS Take 2,000 Units by mouth daily    Historical Provider, MD   amLODIPine (NORVASC) 5 MG tablet Take 1 tablet by mouth daily  Patient not taking: Reported on 8/23/2022 7/21/16   Zuleima Guardado MD       Allergies  Allergies   Allergen Reactions    Codeine Rash       Review of Systems  Please see HPI above. All bolded are positive. All un-bolded are negative. Constitutional Symptoms: fever, chills, fatigue, generalized weakness, diaphoresis, increase in thirst, loss of appetite  Eyes: vision change   Ears, Nose, Mouth, Throat: hearing loss, nasal congestion, sores in the mouth  Cardiovascular: chest pain, chest heaviness, palpitations  Respiratory: shortness of breath, wheezing, coughing  Gastrointestinal: abdominal pain, nausea, vomiting, diarrhea, constipation, melena, hematochezia, hematemesis  Genitourinary: dysuria, hematuria, increased frequency  Musculoskeletal: lower extremity edema, myalgias, arthralgias, back pain  Integumentary: rashes, itching   Neurological: headache, lightheadedness, dizziness, confusion, syncope, numbness, tingling, focal weakness  Psychiatric: depression, suicidal ideation, anxiety  Endocrine: unintentional weight change  Hematologic/Lymphatic: lymphadenopathy, easy bruising, easy bleeding   Allergic/Immunologic: recurrent infections      Objective  VITALS:  BP (!) 121/59   Pulse 87   Temp 98.7 °F (37.1 °C) (Oral)   Resp 16   Ht 5' 5\" (1.651 m)   Wt 183 lb 1.6 oz (83.1 kg)   SpO2 94%   BMI 30.47 kg/m²     Physical Exam:  General: awake, alert, oriented to person, place, time, and purpose, appears stated age, cooperative, no acute distress, pleasant, appropriate mood  Eyes: conjunctivae/corneas clear, sclera non icteric, EOMI  Ears: no obvious scars, no lesions, no masses, hearing intact  Mouth: mucous membranes moist, no obvious oral sores  Head: normocephalic, atraumatic  Neck: no JVD, no adenopathy, no thyromegaly, neck is supple, trachea is midline  Back: ROM normal, no CVA tenderness.   Chest: no pain on palpation  Lungs: clear to auscultation bilaterally, without rhonchi, crackle, wheezing, or rale, no retractions or use of accessory muscles  Heart: regular rate and regular rhythm, no murmur, normal S1, S2  Abdomen: soft, non-tender; bowel sounds normal; no masses, no organomegaly  : Deferred   Extremities: no lower extremity edema, extremities atraumatic, no cyanosis, no clubbing, 2+ pedal pulses palpated  Skin: normal color, normal texture, normal turgor, no rashes, no lesions  Neurologic:5/5 muscle strength throughout, normal muscle tone throughout, face symmetric, hearing intact, tongue midline, speech appropriate without slurring, sensation to fine touch intact in upper and lower extremities    Labs-   Lab Results   Component Value Date    WBC 14.8 (H) 08/23/2022    HGB 10.4 (L) 08/23/2022    HCT 32.7 (L) 08/23/2022     08/23/2022     08/23/2022    K 4.5 08/23/2022     08/23/2022    CREATININE 1.3 (H) 08/23/2022    BUN 22 08/23/2022    CO2 22 08/23/2022    GLUCOSE 154 (H) 08/23/2022    ALT 15 08/23/2022    AST 31 08/23/2022    INR 1.1 07/20/2016     Lab Results   Component Value Date    CKTOTAL 53 07/21/2016    CKMB 1.4 07/21/2016    TROPONINI <0.01 07/21/2016       Last echocardiogram:      Recent Radiological Studies:  CT ABDOMEN PELVIS WO CONTRAST Additional Contrast? None   Final Result   Multiple subcentimeter calculi distal 3rd of the right ureter with the   largest measuring 8 mm and associated with severe right hydronephrosis. Multiple subcentimeter nonobstructing right renal calculi. Thickening of the proximal sigmoid colon with adjacent inflammatory stranding   worrisome for diverticulitis. Correlate with left lower quadrant pain. Hiatal hernia.          FL RETROGRADE PYELOGRAM W WO KUB    (Results Pending)       Assessment  Active Hospital Problems    Diagnosis     Hydronephrosis [N13.30]      Priority: Medium    Essential hypertension [I10]     Hyperlipidemia [E78.5]     Vitamin D deficiency [E55.9] Patient Active Problem List    Diagnosis Date Noted    Hydronephrosis 08/22/2022     Priority: Medium    Chest pain 07/20/2016    Essential hypertension 07/20/2016    Hyperlipidemia 07/20/2016    Vitamin D deficiency 07/20/2016       Plan  6 mm right distal ureteral stone with hydronephrosis   Imaging reviewed   Urology consultation  SP OR for cystoscopy, retrogrades and right stent insertion  Complicated UTI  Urine culture sent from urine prior to abx (requested)  Rocephin for now   Follow cx   Continue IVF  JENA  1.4 on admission   Urine lytes --   IVFs  Monitor daily   Essential HTN  Monitor BP  Continue home regimen   PT/OT  Home medications to be reconciled and confirmed prior to being ordered  DVT prophylaxis   Code Status Full   Discharge plan: Once culture comes back and an appropriate abx selection can be made plan is DC home     Cristy Johnson MD  Internal medicine   8/23/2022  9:02 AM    I can be reached through [employer]. NOTE:  This report was transcribed using voice recognition software. Every effort was made to ensure accuracy; however, inadvertent computerized transcription errors may be present.

## 2022-08-23 NOTE — OP NOTE
Operative Note      Patient: Felicia Andrade  YOB: 1950  MRN: [id number]    Date of Procedure: 8/23/2022    Pre-Op Diagnosis: 6 mm, right, distal ureteral stone with early sepsis of urinary origin    Post-Op Diagnosis: Same       Procedure(s):  CYSTOSCOPY RETROGRADE PYELOGRAM RIGHT STENT INSERTION    Surgeon(s):  Darcie Roach MD    Assistant:   * No surgical staff found *    Anesthesia: Monitor Anesthesia Care    Estimated Blood Loss (mL): Minimal    Complications: None    Specimens:   ID Type Source Tests Collected by Time Destination   1 : RIGHT KIDNEY URINE Urine Urine, Cystoscopic CULTURE, URINE Darcie Roach MD 8/23/2022 1022        Implants:  Implant Name Type Inv. Item Serial No.  Lot No. LRB No. Used Action   STENT URET L26CM OD48FR PERCFLX DBL PGTL FLX TIP Shavonne Reeves [id number]  Riesa Brawn L26CM OD48FR PERCFLX DBL PGTL FLX TIP Radha Dillon [facility]- [id number] Right 1 Implanted         Drains:   Ureteral Drain/Stent 08/23/22 Right Ureter (Active)           INDICATION FOR PROCEDURE: Felicia Andrade is a 70 y.o. who  was found to have a ureteral stone with hydronephrosis and signs of sepsis including tachycardia, elevated white blood cell count, low-grade fever at home. The patient is to undergo cystoscopy with stent insertion urgently. [place] understands the risks, benefits, alternatives of the procedure as well as the fact that the stone will not be treated today ,signed informed consent and agrees to proceed. PROCEDURE:   The patient was brought into the operating room and placed under anesthesia in the dorsal lithotomy position. [place] was prepped and draped in a sterile fashion. A 21-Ukrainian cystoscope with a 30-degree lens was passed through the urethra and into the bladder. The entire length of the urethra was examined and found to be without strictures or other abnormalities.  The entire bladder mucosal surface was examined under 30-degree endoscopy and found to be without calculi, tumors, diverticula, or other abnormalities. The ureteral orifices were normal in size, number, and location. T    A 5 Italian open-ended catheter was passed into the right ureter. Contrast dye was injected into the ureter gently and filling defect was seen in the mid to distal ureter. A wire was passed through the catheter and into the ureter. This was passed beyond the stone and into the renal pelvis under fluoroscopic guidance. An efflux of mildly purulent urine was seen and the decision was made to place a ureteral stent at this time. A 4.8 x 26 ureteral stent was placed over the wire and into the renal pelvis. Mildly purulent urine was seen coming from the stent and sent for culture. The bladder was drained. The patient was awakened from anesthesia and taken to recovery in stable condition. PLAN:  [place] will be treated with antibiotics until the infection has resolved. At a later date and as an outpatient the stone will be managed via ureteroscopy with laser lithotripsy.     Merry Palomo MD, M.D.  8/23/2022  10:34 AM      Electronically signed by Merry Palomo MD on 8/23/2022 at 10:33 AM

## 2022-08-23 NOTE — ED NOTES
Arrives with right flank pain x1w, n/v/fever yesterday which has resolved today. Denies urinary sx but states her urine is very cloudy and dark. Urine sample sent. IV/labs sent. Pt calm/cooperative, AOx4, even respirations.       Claudell Mans, RN  08/22/22 6231

## 2022-08-24 LAB
ALBUMIN SERPL-MCNC: 2.9 G/DL (ref 3.5–5.2)
ALP BLD-CCNC: 251 U/L (ref 35–104)
ALT SERPL-CCNC: 77 U/L (ref 0–32)
ANION GAP SERPL CALCULATED.3IONS-SCNC: 12 MMOL/L (ref 7–16)
AST SERPL-CCNC: 96 U/L (ref 0–31)
BASOPHILS ABSOLUTE: 0.04 E9/L (ref 0–0.2)
BASOPHILS RELATIVE PERCENT: 0.4 % (ref 0–2)
BILIRUB SERPL-MCNC: 0.7 MG/DL (ref 0–1.2)
BUN BLDV-MCNC: 18 MG/DL (ref 6–23)
CALCIUM SERPL-MCNC: 8.7 MG/DL (ref 8.6–10.2)
CHLORIDE BLD-SCNC: 105 MMOL/L (ref 98–107)
CO2: 20 MMOL/L (ref 22–29)
CREAT SERPL-MCNC: 1.1 MG/DL (ref 0.5–1)
EOSINOPHILS ABSOLUTE: 0.3 E9/L (ref 0.05–0.5)
EOSINOPHILS RELATIVE PERCENT: 2.6 % (ref 0–6)
GFR AFRICAN AMERICAN: 59
GFR NON-AFRICAN AMERICAN: 49 ML/MIN/1.73
GLUCOSE BLD-MCNC: 172 MG/DL (ref 74–99)
HCT VFR BLD CALC: 32.1 % (ref 34–48)
HEMOGLOBIN: 10.4 G/DL (ref 11.5–15.5)
IMMATURE GRANULOCYTES #: 0.11 E9/L
IMMATURE GRANULOCYTES %: 1 % (ref 0–5)
LYMPHOCYTES ABSOLUTE: 1.78 E9/L (ref 1.5–4)
LYMPHOCYTES RELATIVE PERCENT: 15.6 % (ref 20–42)
MAGNESIUM: 2 MG/DL (ref 1.6–2.6)
MCH RBC QN AUTO: 30.1 PG (ref 26–35)
MCHC RBC AUTO-ENTMCNC: 32.4 % (ref 32–34.5)
MCV RBC AUTO: 93 FL (ref 80–99.9)
MONOCYTES ABSOLUTE: 0.8 E9/L (ref 0.1–0.95)
MONOCYTES RELATIVE PERCENT: 7 % (ref 2–12)
NEUTROPHILS ABSOLUTE: 8.38 E9/L (ref 1.8–7.3)
NEUTROPHILS RELATIVE PERCENT: 73.4 % (ref 43–80)
PDW BLD-RTO: 12.8 FL (ref 11.5–15)
PLATELET # BLD: 378 E9/L (ref 130–450)
PMV BLD AUTO: 10.2 FL (ref 7–12)
POTASSIUM REFLEX MAGNESIUM: 3.4 MMOL/L (ref 3.5–5)
RBC # BLD: 3.45 E12/L (ref 3.5–5.5)
SODIUM BLD-SCNC: 137 MMOL/L (ref 132–146)
TOTAL PROTEIN: 7.3 G/DL (ref 6.4–8.3)
WBC # BLD: 11.4 E9/L (ref 4.5–11.5)

## 2022-08-24 PROCEDURE — 83735 ASSAY OF MAGNESIUM: CPT

## 2022-08-24 PROCEDURE — 6360000002 HC RX W HCPCS: Performed by: UROLOGY

## 2022-08-24 PROCEDURE — 36415 COLL VENOUS BLD VENIPUNCTURE: CPT

## 2022-08-24 PROCEDURE — 1200000000 HC SEMI PRIVATE

## 2022-08-24 PROCEDURE — 85025 COMPLETE CBC W/AUTO DIFF WBC: CPT

## 2022-08-24 PROCEDURE — 2580000003 HC RX 258: Performed by: UROLOGY

## 2022-08-24 PROCEDURE — 6370000000 HC RX 637 (ALT 250 FOR IP): Performed by: UROLOGY

## 2022-08-24 PROCEDURE — 80053 COMPREHEN METABOLIC PANEL: CPT

## 2022-08-24 RX ADMIN — ACETAMINOPHEN 650 MG: 325 TABLET ORAL at 23:06

## 2022-08-24 RX ADMIN — SODIUM CHLORIDE, PRESERVATIVE FREE 10 ML: 5 INJECTION INTRAVENOUS at 23:06

## 2022-08-24 RX ADMIN — WATER 1000 MG: 1 INJECTION INTRAMUSCULAR; INTRAVENOUS; SUBCUTANEOUS at 23:06

## 2022-08-24 ASSESSMENT — PAIN DESCRIPTION - LOCATION: LOCATION: ABDOMEN

## 2022-08-24 ASSESSMENT — PAIN SCALES - GENERAL
PAINLEVEL_OUTOF10: 2
PAINLEVEL_OUTOF10: 2

## 2022-08-24 ASSESSMENT — PAIN DESCRIPTION - DESCRIPTORS: DESCRIPTORS: ACHING

## 2022-08-24 NOTE — CARE COORDINATION
Social work / Discharge Planning:        Social work met with patient for initial assessment. Patient lives alone and uses no DME. She has no history of HC or MARY. Patient states that she has necessary diabetic supplies. Discharge plan is home. Patient anticipates no needs.   Electronically signed by AZAEL [place] on 8/24/2022 at 10:47 AM

## 2022-08-24 NOTE — PROGRESS NOTES
Physician Progress Note      Gus Morrison  CSN #:                  [id number]  :                       1950  ADMIT DATE:       2022 9:04 PM  [address] [place] DATE:  RESPONDING  PROVIDER #:        Brian Haile          QUERY TEXT:    Pt admitted with nephrolithiasis. Pt noted to have fever, tachycardia,   hypotension, and leukocytosis. If possible, please document in the progress   notes and discharge summary if you are evaluating and/or treating any of the   following: The medical record reflects the following:  Risk Factors: UTI  Clinical Indicators: WBC 18.6, CRP 27.0, Procal 4.19, T 100.5, , BP   88/65, and per Op note \". ..6 mm, right, distal ureteral stone with early   sepsis of urinary origin. Alexus Holt \"  Treatment: IV fluids, IV Rocephin    Thank you,  Lanny RODRIGUEZN, RN, CDIS  Clinical Documentation Improvement  [email or web address]  Options provided:  -- Sepsis, present on admission  -- Sepsis was ruled out  -- Other - I will add my own diagnosis  -- Disagree - Not applicable / Not valid  -- Disagree - Clinically unable to determine / Unknown  -- Refer to Clinical Documentation Reviewer    PROVIDER RESPONSE TEXT:    This patient has sepsis which was present on admission.     Query created by: Paula Hernadez on 2022 9:46 AM      Electronically signed by:  Brian Haile 2022 12:16 PM

## 2022-08-24 NOTE — DISCHARGE INSTRUCTIONS
Call upon discharge to schedule a follow-up visit with Nafisa Quijano/Pooja/Bruna ([facility]) at [phone]    A ureteral stent was inserted during your recent procedure. Unlike a heart \"stent\" which is metal, short, and permanent, this ureteral stent plastic, and temporary. It spans from your kidney, down the ureter, and into your bladder. This will need to be removed, so it is very important that you follow-up with your doctor. IMPORTANT - This ureteral stent will likely cause you to experience frequent urination, urgency to urinate, back/flank pain with urination, and/or blood in the urine. These things are very normal.  Taking the pain medications and/or anti-inflammatories will help to manage this discomfort if present. If you have any questions or concerns you can contact [facility] office at [phone]. Ureteral Stent Placement: What to Expect at [address]     A ureteral (say \"you-REE-ter-ul\") stent is a thin, hollow tube that is placed in the ureter to help urine pass from the kidney into the bladder. Ureters are the tubes that connect the kidneys to the bladder. You may have a small amount of blood in your urine for 1 to 3 days after the procedure. While the stent is in place, you may have to urinate more often, feel a sudden need to urinate, or feel like you can't completely empty your bladder. You may feel some pain when you urinate or do strenuous activity. You also may notice a small amount of blood in your urine after strenuous activities. These side effects usually don't prevent people from doing their normal daily activities. You may have a thin string coming out of your urethra. Your urethra is the tube that carries urine from your bladder to outside your body. This string is attached to the stent. Try not to pull on the string. It will be used to pull out the stent when you no longer need it.   After the procedure, urine may flow better from your kidneys to your bladder. A ureteral stent may be left in place for several days or for as long as several months. Your doctor will take it out when you no longer need it. Or, in some cases, it may be taken out at home. This care sheet gives you a general idea about how long it will take for you to recover. But each person recovers at a different pace. Follow the steps below to get better as quickly as possible. How can you care for yourself at home? Activity    Rest when you feel tired. Getting enough sleep will help you recover. Avoid strenuous activities, such as bicycle riding, jogging, weight lifting, or aerobic exercise, until your doctor says it is okay. Ask your doctor when you can drive again. Most people are able to return to work the day after the procedure. If your work requires intense activity, you may feel pain in your kidney area or get tired easily. If this happens, you may need to do less strenuous activities while the stent is in. Ask your doctor when it is okay for you to have sex. Diet    You can eat your normal diet. If your stomach is upset, try bland, low-fat foods like plain rice, broiled chicken, toast, and yogurt. Drink plenty of fluids (unless your doctor tells you not to). Medicines    Your doctor will tell you if and when you can restart your medicines. You will also get instructions about taking any new medicines. If you take aspirin or some other blood thinner, ask your doctor if and when to start taking it again. Make sure that you understand exactly what your doctor wants you to do. Be safe with medicines. Take pain medicines exactly as directed. If the doctor gave you a prescription medicine for pain, take it as prescribed. If you are not taking a prescription pain medicine, ask your doctor if you can take an over-the-counter medicine. If you think your pain medicine is making you sick to your stomach:   Take your medicine after meals (unless your doctor has told you not to). Ask your doctor for a different pain medicine. If your doctor prescribed antibiotics, take them as directed. Do not stop taking them just because you feel better. You need to take the full course of antibiotics. Follow-up care is a key part of your treatment and safety. Be sure to make and go to all appointments, and call your doctor if you are having problems. It's also a good idea to know your test results and keep a list of the medicines you take. When should you call for help? Call 911 anytime you think you may need emergency care. For example, call if:    You passed out (lost consciousness). You have severe trouble breathing. You have sudden chest pain and shortness of breath, or you cough up blood. You have severe belly pain. Call your doctor now or seek immediate medical care if:    Part or all of the stent comes out of your urethra. You have pain that does not get better after you take pain medicine. You have symptoms of a urinary infection. For example: You have blood or pus in your urine. You have pain in your back just below your rib cage. This is called flank pain. You have a fever, chills, or body aches. It hurts to urinate. You have groin or belly pain. You cannot control when you urinate, or you leak urine. Watch closely for changes in your health, and be sure to contact your doctor if you have any problems. Where can you learn more? Go to [email or web address]. org and sign in to your DocRun account. Enter X653 in the [facility] box to learn more about \"Ureteral Stent Placement: What to Expect at Home. \"     If you do not have an account, please click on the \"Sign Up Now\" link. Current as of: February 10, 2021               Content Version: 12.9  © 2006-2021 Healthwise, Incorporated. Care instructions adapted under license by [facility] ([facility]).  If you have questions about a medical condition or this instruction, always ask your healthcare professional. [place] [address] any warranty or liability for your use of this information.

## 2022-08-24 NOTE — PROGRESS NOTES
Internal Medicine Progress Note    Patient's name: Jose Francisco Hu  : 1950  Chief complaints (on day of admission): Other (Sent in from [address] for abnormal US, + kidney stones ) and Flank Pain  Admission date: 2022  Date of service: 2022   Room: [address] SURG  Primary care physician: Dileep Hudson MD  Reason for visit: Follow-up for     0 Skylyn  was seen and examined at bedside     She is doing well   Looks well   Family at bedside   No new issues   Abdominal pain still present but improved   She is anxious to go home   I told her I would prefer to wait until her urine cx is back prior to her dc to select an appropriate abx   Urology to see today   All questions answered   Discussed with nursing staff     Current treatment plan discussed and all questions answered     Current medications being prescribed discussed and patient expresses verbal understanding     Review of Systems  There are no new complaints of chest pain, shortness of breath, abdominal pain, nausea, vomiting, diarrhea, constipation unless otherwise mentioned above.      Hospital Medications  Current Facility-Administered Medications   Medication Dose Route Frequency Provider Last Rate Last Admin    cefTRIAXone (ROCEPHIN) 1,000 mg in sterile water 10 mL IV syringe  1,000 mg IntraVENous Q24H Myrtle Patton MD   1,000 mg at 22 2257    traMADol (ULTRAM) tablet 50 mg  50 mg Oral Q6H PRN Myrtle Patton MD        hydrALAZINE (APRESOLINE) injection 10 mg  10 mg IntraVENous Q4H PRN Carmen Gonzalez MD        melatonin tablet 3 mg  3 mg Oral Nightly PRN Myrtle Patton MD        sodium chloride flush 0.9 % injection 10 mL  10 mL IntraVENous 2 times per day Carmen Gonzalez MD   10 mL at 22 1236    sodium chloride flush 0.9 % injection 10 mL  10 mL IntraVENous PRN Myrtle Patton MD        0.9 % sodium chloride infusion   IntraVENous PRN Carmen Gonzalez MD        enoxaparin (LOVENOX) injection 40 mg  40 mg SubCUTAneous Daily Bertha Patton MD        ondansetron (ZOFRAN-ODT) disintegrating tablet 4 mg  4 mg Oral Q8H PRN Bertha Patton MD        Or    ondansetron [facility]) injection 4 mg  4 mg IntraVENous Q6H PRN Najma Sam MD        senna (SENOKOT) tablet 8.6 mg  1 tablet Oral Daily PRN Bertha Patton MD        acetaminophen (TYLENOL) tablet 650 mg  650 mg Oral Q6H PRN Najma Sam MD   650 mg at 08/23/22 2157    Or    acetaminophen (TYLENOL) suppository 650 mg  650 mg Rectal Q6H PRN Bertha Patton MD        amLODIPine (NORVASC) tablet 5 mg  5 mg Oral Daily Bertha Patton MD           PRN Medications  traMADol, hydrALAZINE, melatonin, sodium chloride flush, sodium chloride, ondansetron **OR** ondansetron, senna, acetaminophen **OR** acetaminophen    Objective  Most Recent Recorded Vitals  /65   Pulse 82   Temp 98.2 °F (36.8 °C) (Temporal)   Resp 19   Ht 5' 5\" (1.651 m)   Wt 183 lb 1.6 oz (83.1 kg)   SpO2 97%   BMI 30.47 kg/m²   I/O last 3 completed shifts: In: 300 [I.V.:300]  Out: 300 [Urine:300]  No intake/output data recorded.     Physical Exam:  General: AAO to person/place/time/purpose, NAD, no labored breathing  Eyes: conjunctivae/corneas clear, sclera non icteric  Skin: color/texture/turgor normal, no rashes or lesions  Lungs: CTAB, no retractions/use of accessory muscles, no vocal fremitus, no rhonchi, no crackle, no rales  Heart: regular rate, regular rhythm, no murmur  Abdomen: soft, NT, bowel sounds normal  Extremities: atraumatic, no edema  Neurologic: cranial nerves 2-12 grossly intact, no slurred speech    Most Recent Labs  Lab Results   Component Value Date    WBC 11.4 08/24/2022    HGB 10.4 (L) 08/24/2022    HCT 32.1 (L) 08/24/2022     08/24/2022     08/24/2022    K 3.4 (L) 08/24/2022     08/24/2022    CREATININE 1.1 (H) 08/24/2022    BUN 18 08/24/2022    CO2 20 (L) 08/24/2022    GLUCOSE 172 (H) 08/24/2022    ALT 77 (H) 08/24/2022    AST 96 (H) 08/24/2022    INR 1.1 07/20/2016    LABA1C 6.4 (H) 05/12/2016       FL RETROGRADE PYELOGRAM W WO KUB   Final Result   Intraprocedural fluoroscopic spot images as above. See separate procedure   report for more information. CT ABDOMEN PELVIS WO CONTRAST Additional Contrast? None   Final Result   Multiple subcentimeter calculi distal 3rd of the right ureter with the   largest measuring 8 mm and associated with severe right hydronephrosis. Multiple subcentimeter nonobstructing right renal calculi. Thickening of the proximal sigmoid colon with adjacent inflammatory stranding   worrisome for diverticulitis. Correlate with left lower quadrant pain. Hiatal hernia. Echocardiogram       Assessment   Active Hospital Problems    Diagnosis     Hydronephrosis [N13.30]      Priority: Medium    Essential hypertension [I10]     Hyperlipidemia [E78.5]     Vitamin D deficiency [E55.9]          Plan  6 mm right distal ureteral stone with hydronephrosis   Imaging reviewed   Urology consultation  SP OR for cystoscopy, retrogrades and right stent insertion  Complicated UTI  Urine culture sent from urine prior to abx (requested)  Rocephin for now   Follow cx   Continue IVF  JENA  1.4 on admission   Now 1.1   Improving   Urine lytes reviewed   IVFs off now encourage PO intake   Monitor daily   Essential HTN  Monitor BP  Continue home regimen     Consults Uro  DVT prophylaxis   Code status Full   Medications, labs and imaging reviewed   Discharge plan: Once final ucx back and an appropriate abx selection can be made    Electronically signed by Brittney Michel MD on 8/24/2022 at 9:16 AM    I can be reached through [facility].

## 2022-08-24 NOTE — PROGRESS NOTES
[facility] Quality Flow/Interdisciplinary Rounds Progress Note        Quality Flow Rounds held on August 24, 2022    Disciplines Attending:  Bedside Nurse, , , and Nursing Unit Forest Slaughter was admitted on 8/22/2022  9:04 PM    Anticipated Discharge Date:       Disposition:    Portillo Score:  Portillo Scale Score: 21    Readmission Risk              Risk of Unplanned Readmission:  9           Discussed patient goal for the day, patient clinical progression, and barriers to discharge. The following Goal(s) of the Day/Commitment(s) have been identified:   Continue IV abx, check urology plan and discharge planning home.       Mitra Shaffer, RN  August 24, 2022

## 2022-08-24 NOTE — PROGRESS NOTES
[facility]  (S[facility]/ Pooja/[facility]run[facility])      PROGRESS NOTE         PATIENT NAME: Avinash Schaefer OF BIRTH: 1950  ADMISSION DATE: 8/22/2022  9:04 PM   TODAY'S DATE: 8/24/2022        Subjective       Doing much better. No pain. Waiting on final urine culture. Objective     VS:   /73   Pulse 73   Temp 98.1 °F (36.7 °C) (Oral)   Resp 18   Ht 5' 5\" (1.651 m)   Wt 183 lb 1.6 oz (83.1 kg)   SpO2 100%   BMI 30.47 kg/m²     I & O - 24hr:  No intake or output data in the 24 hours ending 08/24/22 1548      Physical Exam:  General:  Neck:  Resp:  Abdomen:   No acute distress  Supple  Normal effort  Soft, non-tender, nondistended   :  defered   Skin: Skin color, texture, turgor normal, no rashes or lesions       Labs and Imaging Studies   Labs:    CBC:   Recent Labs     08/22/22 2210 08/23/22  0430 08/24/22  0535   WBC 18.6* 14.8* 11.4   HGB 12.6 10.4* 10.4*   HCT 38.5 32.7* 32.1*   MCV 92.5 94.8 93.0    304 378     BMP:  Recent Labs     08/22/22 2210 08/23/22  0430 08/24/22  0535   * 134 137   K 3.9 4.5 3.4*   CL 94* 102 105   CO2 21* 22 20*   BUN 23 22 18   CREATININE 1.4* 1.3* 1.1*       Magnesium:   Lab Results   Component Value Date/Time    MG 2.0 08/24/2022 05:35 AM      Phosphate: No results found for: PHOS  PT/INR: No results for input(s): PROTIME, INR in the last 72 hours.     U/A:   Lab Results   Component Value Date/Time    LEUKOCYTESUR LARGE 08/22/2022 10:10 PM    PHUR 6.0 08/22/2022 10:10 PM    WBCUA >20 08/22/2022 10:10 PM    RBCUA 5-10 08/22/2022 10:10 PM    BACTERIA MANY 08/22/2022 10:10 PM    SPECGRAV 1.025 08/22/2022 10:10 PM    BLOODU MODERATE 08/22/2022 10:10 PM    GLUCOSEU Negative 08/22/2022 10:10 PM       Urine Culture:       Component Value Date/Time    LABURIN Growth not present, incubation continues 08/23/2022 1022        Blood Culture:     Imaging Studies:          Assessment and Plan       ASSESMENT:  69 y/o F s/p right stent for 6 mm distal ureteral calculus    PLAN:  Await urine culture. When finalized home on Abx. Will need definitive surgery as an outpatient. Stable for DC from urology standpoint. Call with questions.        Beverely Severs, MD  [facility]  8/24/2022  3:48 PM

## 2022-08-25 VITALS
DIASTOLIC BLOOD PRESSURE: 67 MMHG | HEART RATE: 73 BPM | RESPIRATION RATE: 16 BRPM | TEMPERATURE: 97.5 F | OXYGEN SATURATION: 95 % | BODY MASS INDEX: 30.51 KG/M2 | WEIGHT: 183.1 LBS | HEIGHT: 65 IN | SYSTOLIC BLOOD PRESSURE: 135 MMHG

## 2022-08-25 LAB
BASOPHILS ABSOLUTE: 0.05 E9/L (ref 0–0.2)
BASOPHILS RELATIVE PERCENT: 0.5 % (ref 0–2)
EOSINOPHILS ABSOLUTE: 0.32 E9/L (ref 0.05–0.5)
EOSINOPHILS RELATIVE PERCENT: 3.3 % (ref 0–6)
HCT VFR BLD CALC: 29.1 % (ref 34–48)
HEMOGLOBIN: 9.6 G/DL (ref 11.5–15.5)
IMMATURE GRANULOCYTES #: 0.08 E9/L
IMMATURE GRANULOCYTES %: 0.8 % (ref 0–5)
LYMPHOCYTES ABSOLUTE: 1.73 E9/L (ref 1.5–4)
LYMPHOCYTES RELATIVE PERCENT: 17.7 % (ref 20–42)
MCH RBC QN AUTO: 30.9 PG (ref 26–35)
MCHC RBC AUTO-ENTMCNC: 33 % (ref 32–34.5)
MCV RBC AUTO: 93.6 FL (ref 80–99.9)
METER GLUCOSE: 181 MG/DL (ref 74–99)
MONOCYTES ABSOLUTE: 0.74 E9/L (ref 0.1–0.95)
MONOCYTES RELATIVE PERCENT: 7.6 % (ref 2–12)
NEUTROPHILS ABSOLUTE: 6.87 E9/L (ref 1.8–7.3)
NEUTROPHILS RELATIVE PERCENT: 70.1 % (ref 43–80)
ORGANISM: ABNORMAL
PDW BLD-RTO: 12.9 FL (ref 11.5–15)
PLATELET # BLD: 356 E9/L (ref 130–450)
PMV BLD AUTO: 10.6 FL (ref 7–12)
RBC # BLD: 3.11 E12/L (ref 3.5–5.5)
URINE CULTURE, ROUTINE: ABNORMAL
URINE CULTURE, ROUTINE: NORMAL
WBC # BLD: 9.8 E9/L (ref 4.5–11.5)

## 2022-08-25 PROCEDURE — 36415 COLL VENOUS BLD VENIPUNCTURE: CPT

## 2022-08-25 PROCEDURE — 85025 COMPLETE CBC W/AUTO DIFF WBC: CPT

## 2022-08-25 PROCEDURE — 2580000003 HC RX 258: Performed by: UROLOGY

## 2022-08-25 PROCEDURE — 82962 GLUCOSE BLOOD TEST: CPT

## 2022-08-25 PROCEDURE — 6360000002 HC RX W HCPCS: Performed by: UROLOGY

## 2022-08-25 RX ORDER — CEFDINIR 300 MG/1
300 CAPSULE ORAL 2 TIMES DAILY
Qty: 20 CAPSULE | Refills: 0 | Status: SHIPPED | OUTPATIENT
Start: 2022-08-25 | End: 2022-09-04

## 2022-08-25 RX ADMIN — Medication 10 ML: at 11:07

## 2022-08-25 RX ADMIN — ENOXAPARIN SODIUM 40 MG: 100 INJECTION SUBCUTANEOUS at 09:32

## 2022-08-25 ASSESSMENT — PAIN SCALES - GENERAL: PAINLEVEL_OUTOF10: 0

## 2022-08-25 NOTE — PROGRESS NOTES
P Quality Flow/Interdisciplinary Rounds Progress Note        Quality Flow Rounds held on August 25, 2022    Disciplines Attending:  Bedside Nurse, , , and Nursing Unit Forest Rodriguez was admitted on 8/22/2022  9:04 PM    Anticipated Discharge Date:       Disposition:    Portillo Score:  Portillo Scale Score: 21    Readmission Risk              Risk of Unplanned Readmission:  9           Discussed patient goal for the day, patient clinical progression, and barriers to discharge. The following Goal(s) of the Day/Commitment(s) have been identified:  Discharge.       Anny Rizo RN  August 25, 2022

## 2022-08-25 NOTE — PROGRESS NOTES
Internal Medicine Progress Note    Patient's name: Елена Rudd  : 1950  Chief complaints (on day of admission): Other (Sent in from [address] for abnormal US, + kidney stones ) and Flank Pain  Admission date: 2022  Date of service: 2022   Room: [address]  Primary care physician: Zuleima Guardado MD  Reason for visit: Follow-up for      Skylyn  was seen and examined at bedside     Doing very well today pain is better she is eating and drinking and has no new complaints     Current treatment plan discussed and all questions answered     Current medications being prescribed discussed and patient expresses verbal understanding     Review of Systems  There are no new complaints of chest pain, shortness of breath, abdominal pain, nausea, vomiting, diarrhea, constipation unless otherwise mentioned above.      Hospital Medications  Current Facility-Administered Medications   Medication Dose Route Frequency Provider Last Rate Last Admin    cefTRIAXone (ROCEPHIN) 1,000 mg in sterile water 10 mL IV syringe  1,000 mg IntraVENous Q24H Quita Patton MD   1,000 mg at 22 2306    traMADol (ULTRAM) tablet 50 mg  50 mg Oral Q6H PRN Quita Patton MD        hydrALAZINE (APRESOLINE) injection 10 mg  10 mg IntraVENous Q4H PRN Quita Patton MD        melatonin tablet 3 mg  3 mg Oral Nightly PRN Quita Patton MD        sodium chloride flush 0.9 % injection 10 mL  10 mL IntraVENous 2 times per day Donata Mcburney, MD   10 mL at 22 1236    sodium chloride flush 0.9 % injection 10 mL  10 mL IntraVENous PRN Quita Patton MD   10 mL at 22 2306    0.9 % sodium chloride infusion   IntraVENous PRN Quita Patton MD        enoxaparin (LOVENOX) injection 40 mg  40 mg SubCUTAneous Daily Quita Patton MD        ondansetron (ZOFRAN-ODT) disintegrating tablet 4 mg  4 mg Oral Q8H PRN Donata Mcburney, MD        Or    ondansetron [facility]) injection information. CT ABDOMEN PELVIS WO CONTRAST Additional Contrast? None   Final Result   Multiple subcentimeter calculi distal 3rd of the right ureter with the   largest measuring 8 mm and associated with severe right hydronephrosis. Multiple subcentimeter nonobstructing right renal calculi. Thickening of the proximal sigmoid colon with adjacent inflammatory stranding   worrisome for diverticulitis. Correlate with left lower quadrant pain. Hiatal hernia. Echocardiogram       Assessment   Active Hospital Problems    Diagnosis     Hydronephrosis [N13.30]      Priority: Medium    Essential hypertension [I10]     Hyperlipidemia [E78.5]     Vitamin D deficiency [E55.9]          Plan  6 mm right distal ureteral stone with hydronephrosis   Imaging reviewed   Urology consultation  SP OR for cystoscopy, retrogrades and right stent insertion  Complicated UTI  Urine culture sent from urine prior to abx (requested)  Rocephin for now   Follow cx   Continue IVF  JENA  1.4 on admission   Now 1.1   Improving   Urine lytes reviewed   IVFs off now encourage PO intake   Monitor daily   Essential HTN  Monitor BP  Continue home regimen     Consults Uro  DVT prophylaxis   Code status Full   Medications, labs and imaging reviewed   Discharge plan: DC today on PO Cefdinir     Electronically signed by Kim Covington MD on 8/25/2022 at 9:29 AM    I can be reached through [address] [place] [address].

## 2022-08-25 NOTE — PROGRESS NOTES
CLINICAL PHARMACY NOTE: MEDS TO BEDS    Total # of Prescriptions Filled: 1   The following medications were delivered to the patient:  Cefdinir 300mg      Additional Documentation:

## 2022-08-25 NOTE — CARE COORDINATION
Social work / Discharge Planning:          Discharge plan is home. No needs identified. Per IDR, possible discharge today.    Electronically signed by AZAEL Hamilton on 8/25/2022 at 9:56 AM

## 2022-08-25 NOTE — DISCHARGE SUMMARY
Internal Medicine Discharge Summary    NAME: Stefano Ferrer :  1950  MRN:  [id number] Ernestina Snyder MD    ADMITTED: 2022   DISCHARGED: 2022  2:45 PM    ADMITTING PHYSICIAN: Michelle att. providers found    PCP: Nicki Reyes MD    CONSULTANT(S):   IP CONSULT TO HOSPITALIST  IP CONSULT TO UROLOGY  IP CONSULT TO SOCIAL WORK     ADMITTING DIAGNOSIS:   Hydronephrosis [N13.30]  Ureterolithiasis [N20.1]  Right flank pain [R10.9]  JENA (acute kidney injury) ([address] [N17.9]  Acute cystitis with hematuria [N30.01]  Leukocytosis, unspecified type [D72.829]     Please see H&P for further details    DISCHARGE DIAGNOSES:   Active Hospital Problems    Diagnosis     Hydronephrosis [N13.30]      Priority: Medium    Essential hypertension [I10]     Hyperlipidemia [E78.5]     Vitamin D deficiency [E55.9]        BRIEF HISTORY OF PRESENT ILLNESS: Stefano Ferrer is a 70 y.o. female patient of Nicki Reyes MD who  has a past medical history of Kidney stone. who originally had concerns including Other (Sent in from [address] for abnormal US, + kidney stones ) and Flank Pain. at presentation on 2022, and was found to have Hydronephrosis [N13.30]  Ureterolithiasis [N20.1]  Right flank pain [R10.9]  JENA (acute kidney injury) ([address] [N17.9]  Acute cystitis with hematuria [N30.01]  Leukocytosis, unspecified type [D72.829] after workup. Please see H&P for further details. HOSPITAL COURSE:   The patient presented to the hospital with the chief complaint of Other (Sent in from [address] for abnormal US, + kidney stones ) and Flank Pain  .  The patient was admitted to the hospital.     6 mm right distal ureteral stone with hydronephrosis   Imaging reviewed   Urology consultation  SP OR for cystoscopy, retrogrades and right stent insertion  Complicated UTI  Urine culture sent from urine prior to abx (requested)  Rocephin for now   Follow cx   Continue IVF  JENA  1.4 on admission   Now 1.1   Improving Urine lytes reviewed   IVFs off now encourage PO intake   Monitor daily   Essential HTN  Monitor BP  Continue home regimen     Consults Uro  DVT prophylaxis   Code status Full   Medications, labs and imaging reviewed   Discharge plan: DC today on PO Cefdinir       BRIEF PHYSICAL EXAMINATION AND LABORATORIES ON DAY OF DISCHARGE:  VITALS:  /67   Pulse 73   Temp 97.5 °F (36.4 °C) (Oral)   Resp 16   Ht 5' 5\" (1.651 m)   Wt 183 lb 1.6 oz (83.1 kg)   SpO2 95%   BMI 30.47 kg/m²       Please see note from the same day. LABS[de-identified]  Recent Labs     08/22/22 2210 08/23/22  0430 08/24/22  0535   * 134 137   K 3.9 4.5 3.4*   CL 94* 102 105   CO2 21* 22 20*   BUN 23 22 18   CREATININE 1.4* 1.3* 1.1*   GLUCOSE 170* 154* 172*   CALCIUM 9.8 8.4* 8.7     Recent Labs     08/22/22 2210 08/23/22  0430 08/24/22  0535   ALKPHOS 132* 154* 251*   ALT 17 15 77*   AST 19 31 96*   PROT 8.3 6.7 7.3   BILITOT 2.1* 0.7 0.7   LABALBU 3.7 2.7* 2.9*     Recent Labs     08/23/22 0430 08/24/22  0535 08/25/22  0255   WBC 14.8* 11.4 9.8   RBC 3.45* 3.45* 3.11*   HGB 10.4* 10.4* 9.6*   HCT 32.7* 32.1* 29.1*   MCV 94.8 93.0 93.6   MCH 30.1 30.1 30.9   MCHC 31.8* 32.4 33.0   RDW 12.5 12.8 12.9    378 356   MPV 10.2 10.2 10.6     Lab Results   Component Value Date    LABA1C 6.4 (H) 05/12/2016    LABA1C 6.0 (H) 05/14/2015    LABA1C 6.1 (H) 05/08/2014     Lab Results   Component Value Date    INR 1.1 07/20/2016    INR 1.1 12/17/2014    PROTIME 11.9 07/20/2016    PROTIME 11.7 12/17/2014      No results found for: TSH  Lab Results   Component Value Date    TRIG 166 (H) 07/21/2016    TRIG 88 05/12/2016    TRIG 151 (H) 05/14/2015    HDL 42 07/21/2016    HDL 25 05/12/2016    HDL 40 05/14/2015    LDLCALC 150 (H) 07/21/2016    LDLCALC 116 (H) 05/12/2016    LDLCALC 138 (H) 05/14/2015     Recent Labs     08/24/22  0535   MG 2.0       No results for input(s): CKTOTAL, CKMB, TROPONINI in the last 72 hours.    Recent Labs     08/22/22  3392 LACTA 1.0       IMAGING:  CT ABDOMEN PELVIS WO CONTRAST Additional Contrast? None    Result Date: 8/22/2022  EXAMINATION: CT OF THE ABDOMEN AND PELVIS WITHOUT CONTRAST 8/22/2022 9:49 pm TECHNIQUE: CT of the abdomen and pelvis was performed without the administration of intravenous contrast. Multiplanar reformatted images are provided for review. Automated exposure control, iterative reconstruction, and/or weight based adjustment of the mA/kV was utilized to reduce the radiation dose to as low as reasonably achievable. COMPARISON: None. HISTORY: ORDERING SYSTEM PROVIDED HISTORY: flank pain, kidney stone TECHNOLOGIST PROVIDED HISTORY: Reason for exam:->flank pain, kidney stone Additional Contrast?->None Decision Support Exception - unselect if not a suspected or confirmed emergency medical condition->Emergency Medical Condition (MA) FINDINGS: Lower Chest: Visualized lungs are unremarkable. Hiatal hernia. Organs: The liver, spleen, adrenal glands, left kidney, pancreas and gallbladder are normal.  Multiple subcentimeter calculi distal 3rd of the right ureter with the largest measuring 8 mm and with severe right hydronephrosis. Multiple subcentimeter nonobstructing right renal calculi. GI/Bowel: Thickening of the proximal descending colon with mild adjacent inflammatory stranding. Findings worrisome for diverticulitis. Correlate with left lower quadrant pain. Normal small bowel. Pelvis: Normal urinary bladder. Peritoneum/Retroperitoneum: No free fluid or free air. Bones/Soft Tissues: Unremarkable. Multiple subcentimeter calculi distal 3rd of the right ureter with the largest measuring 8 mm and associated with severe right hydronephrosis. Multiple subcentimeter nonobstructing right renal calculi. Thickening of the proximal sigmoid colon with adjacent inflammatory stranding worrisome for diverticulitis. Correlate with left lower quadrant pain. Hiatal hernia.      FL RETROGRADE PYELOGRAM W WO KUB    Result Date: 8/23/2022  EXAMINATION: SPOT FLUOROSCOPIC IMAGES 8/23/2022 10:31 am TECHNIQUE: Fluoroscopy was provided by the radiology department for procedure. Radiologist was not present during examination. FLUOROSCOPY DOSE AND TYPE OR TIME AND EXPOSURES: Fluoroscopy time equals 0.3 minutes. Total dose equals 17 point mGy COMPARISON: None HISTORY: ORDERING SYSTEM PROVIDED HISTORY: CYSTO RETRO TECHNOLOGIST PROVIDED HISTORY: Reason for exam:->CYSTO RETRO Intraprocedural imaging. FINDINGS: 4 spot images of the abdomen were obtained. Intraprocedural fluoroscopic spot images as above. See separate procedure report for more information. MICROBIOLOGY:  BLOOD CX #1  Recent Labs     08/22/22  2258   BC 24 Hours no growth     BLOOD CX #2  Recent Labs     08/22/22  2312   BLOODCULT2 24 Hours no growth     TIP CULTURE  No results for input(s): CXCATHTIP in the last 72 hours. CULTURE, RESPIRATORY   No results for input(s): CULTRESP in the last 72 hours. RESPIRATORY SMEAR  No results for input(s): RESPSMEAR in the last 72 hours. ECHO:      DISPOSITION:  The patient's condition is good.    The patient is being discharged to home    DISCHARGE MEDICATIONS:      Medication List        START taking these medications      cefdinir 300 MG capsule  Commonly known as: OMNICEF  Take 1 capsule by mouth 2 times daily for 10 days            CONTINUE taking these medications      Cholecalciferol 100 MCG (4000 UT) Caps     losartan 50 MG tablet  Commonly known as: COZAAR     Magnesium 500 MG Tabs     metFORMIN 500 MG tablet  Commonly known as: GLUCOPHAGE     potassium citrate 10 MEQ (1080 MG) extended release tablet  Commonly known as: UROCIT-K            STOP taking these medications      amLODIPine 5 MG tablet  Commonly known as: NORVASC               Where to Get Your Medications        These medications were sent to [facility], [facility] [address], [address] [place] [address]      Phone: [phone]   cefdinir 300 MG capsule         Discharge Medication List as of 8/25/2022  2:04 PM        Discharge Medication List as of 8/25/2022  2:04 PM        STOP taking these medications       amLODIPine (NORVASC) 5 MG tablet Comments:   Reason for Stopping:             Discharge Medication List as of 8/25/2022  2:04 PM        START taking these medications    Details   cefdinir (OMNICEF) 300 MG capsule Take 1 capsule by mouth 2 times daily for 10 days, Disp-20 capsule, R-0Normal             INTERNAL MEDICINE FOLLOW UP/INSTRUCTIONS:  Follow-up with primary care physician within 1 week of discharge from hospital  Please review changes to pre-hospital admission medications and prescriptions for new medications upon discharge from the hospital with PCP  Please review results of labs and imaging studies with PCP  Follow-up with consultants as directed by them   If recurrence or worsening of symptoms please call primary care physician or return to the ER immediately  Diet: ADULT DIET; Regular    Preparing for this patient's discharge, including paperwork, orders, instructions, and meeting with patient did required >35 minutes.     Electronically signed by Laury Homans, MD on 8/25/2022 at 4:26 PM

## 2022-08-25 NOTE — PROGRESS NOTES
Request for discharge sent to Dr. Saadia Shelley cleared to discharge yesterday, awaiting response.     Electronically signed by Abdiel Sapp RN on 8/25/2022 at 12:27 PM

## 2022-08-27 LAB
ACINETOBACTER CALCOAC BAUMANNII COMPLEX BY PCR: NOT DETECTED
BACTEROIDES FRAGILIS BY PCR: NOT DETECTED
BOTTLE TYPE: ABNORMAL
CANDIDA ALBICANS BY PCR: NOT DETECTED
CANDIDA AURIS BY PCR: NOT DETECTED
CANDIDA GLABRATA BY PCR: NOT DETECTED
CANDIDA KRUSEI BY PCR: NOT DETECTED
CANDIDA PARAPSILOSIS BY PCR: NOT DETECTED
CANDIDA TROPICALIS BY PCR: NOT DETECTED
CARBAPENEM RESISTANCE IMP GENE BY PCR: NOT DETECTED
CARBAPENEM RESISTANCE KPC BY PCR: NOT DETECTED
CARBAPENEM RESISTANCE NDM GENE BY PCR: NOT DETECTED
CARBAPENEM RESISTANCE OXA-48 GENE BY PCR: NOT DETECTED
CARBAPENEM RESISTANCE VIM GENE BY PCR: NOT DETECTED
CEPHALOSPORIN RESISTANCE CTX-M GENE BY PCR: NOT DETECTED
COLISTIN RESISTANCE MCR-1 GENE BY PCR: NOT DETECTED
CRYPTOCOCCUS NEOFORMANS/GATTII BY PCR: NOT DETECTED
ENTEROBACTER CLOACAE COMPLEX BY PCR: NOT DETECTED
ENTEROBACTERALES BY PCR: DETECTED
ENTEROCOCCUS FAECALIS BY PCR: NOT DETECTED
ENTEROCOCCUS FAECIUM BY PCR: NOT DETECTED
ESCHERICHIA COLI BY PCR: NOT DETECTED
HAEMOPHILUS INFLUENZAE BY PCR: NOT DETECTED
KLEBSIELLA AEROGENES BY PCR: NOT DETECTED
KLEBSIELLA OXYTOCA BY PCR: NOT DETECTED
KLEBSIELLA PNEUMONIAE GROUP BY PCR: DETECTED
LISTERIA MONOCYTOGENES BY PCR: NOT DETECTED
NEISSERIA MENINGITIDIS BY PCR: NOT DETECTED
ORDER NUMBER: ABNORMAL
PROTEUS SPECIES BY PCR: NOT DETECTED
PSEUDOMONAS AERUGINOSA BY PCR: NOT DETECTED
SALMONELLA SPECIES BY PCR: NOT DETECTED
SERRATIA MARCESCENS BY PCR: NOT DETECTED
SOURCE OF BLOOD CULTURE: ABNORMAL
STAPHYLOCOCCUS AUREUS BY PCR: NOT DETECTED
STAPHYLOCOCCUS EPIDERMIDIS BY PCR: NOT DETECTED
STAPHYLOCOCCUS LUGDUNENSIS BY PCR: NOT DETECTED
STAPHYLOCOCCUS SPECIES BY PCR: NOT DETECTED
STENOTROPHOMONAS MALTOPHILIA BY PCR: NOT DETECTED
STREPTOCOCCUS AGALACTIAE BY PCR: NOT DETECTED
STREPTOCOCCUS PNEUMONIAE BY PCR: NOT DETECTED
STREPTOCOCCUS PYOGENES  BY PCR: NOT DETECTED
STREPTOCOCCUS SPECIES BY PCR: NOT DETECTED

## 2022-08-28 LAB — BLOOD CULTURE, ROUTINE: NORMAL

## 2022-08-29 ENCOUNTER — HOSPITAL ENCOUNTER (OUTPATIENT)
Age: 72
Discharge: HOME | End: 2022-08-29
Payer: MEDICARE

## 2022-08-29 DIAGNOSIS — R74.01 TRANSAMINITIS: ICD-10-CM

## 2022-08-29 LAB
ALBUMIN SERPL-MCNC: 3.4 G/DL (ref 3.5–5.2)
ALP BLD-CCNC: 144 U/L (ref 35–104)
ALT SERPL-CCNC: 26 U/L (ref 0–32)
AST SERPL-CCNC: 17 U/L (ref 0–31)
BILIRUB SERPL-MCNC: 0.4 MG/DL (ref 0–1.2)
BILIRUBIN DIRECT: <0.2 MG/DL (ref 0–0.3)
BILIRUBIN, INDIRECT: ABNORMAL MG/DL (ref 0–1)
ORGANISM: ABNORMAL
TOTAL PROTEIN: 7.3 G/DL (ref 6.4–8.3)

## 2022-08-29 PROCEDURE — 80076 HEPATIC FUNCTION PANEL: CPT

## 2022-08-29 PROCEDURE — 36415 COLL VENOUS BLD VENIPUNCTURE: CPT

## 2022-09-07 ENCOUNTER — HOSPITAL ENCOUNTER (OUTPATIENT)
Age: 72
Discharge: HOME | End: 2022-09-09

## 2022-09-07 PROCEDURE — 88300 SURGICAL PATH GROSS: CPT

## 2022-09-07 PROCEDURE — 82365 CALCULUS SPECTROSCOPY: CPT

## 2022-09-12 LAB
CALCULI COMPOSITION: NORMAL
MASS: 132 MG
STONE DESCRIPTION: NORMAL

## 2025-02-10 ENCOUNTER — HOSPITAL ENCOUNTER (OUTPATIENT)
Age: 75
Discharge: HOME | End: 2025-02-12

## 2025-02-10 PROCEDURE — 88300 SURGICAL PATH GROSS: CPT

## 2025-02-10 PROCEDURE — 82365 CALCULUS SPECTROSCOPY: CPT

## 2025-02-13 LAB — SURGICAL PATHOLOGY REPORT: NORMAL

## 2025-02-15 LAB
STONE COMPOSITION: NORMAL
STONE DESCRIPTION: NORMAL
STONE MASS: 28 MG

## 2025-03-26 ENCOUNTER — HOSPITAL ENCOUNTER (OUTPATIENT)
Age: 75
Discharge: HOME | End: 2025-03-26
Payer: MEDICARE

## 2025-03-26 LAB
25(OH)D3 SERPL-MCNC: 38.3 NG/ML (ref 30–100)
ANION GAP SERPL CALCULATED.3IONS-SCNC: 11 MMOL/L (ref 7–16)
BUN SERPL-MCNC: 16 MG/DL (ref 6–23)
CALCIUM SERPL-MCNC: 9.8 MG/DL (ref 8.6–10.2)
CHLORIDE SERPL-SCNC: 103 MMOL/L (ref 98–107)
CO2 SERPL-SCNC: 26 MMOL/L (ref 22–29)
CREAT SERPL-MCNC: 1 MG/DL (ref 0.5–1)
GFR, ESTIMATED: 63 ML/MIN/1.73M2
GLUCOSE SERPL-MCNC: 236 MG/DL (ref 74–99)
MAGNESIUM SERPL-MCNC: 1.8 MG/DL (ref 1.6–2.6)
PHOSPHATE SERPL-MCNC: 3.5 MG/DL (ref 2.5–4.5)
POTASSIUM SERPL-SCNC: 4.2 MMOL/L (ref 3.5–5)
PTH-INTACT SERPL-MCNC: 33 PG/ML (ref 15–65)
SODIUM SERPL-SCNC: 140 MMOL/L (ref 132–146)
URATE SERPL-MCNC: 4.9 MG/DL (ref 2.4–5.7)

## 2025-03-26 PROCEDURE — 80048 BASIC METABOLIC PNL TOTAL CA: CPT

## 2025-03-26 PROCEDURE — 36415 COLL VENOUS BLD VENIPUNCTURE: CPT

## 2025-03-26 PROCEDURE — 84100 ASSAY OF PHOSPHORUS: CPT

## 2025-03-26 PROCEDURE — 83970 ASSAY OF PARATHORMONE: CPT

## 2025-03-26 PROCEDURE — 83735 ASSAY OF MAGNESIUM: CPT

## 2025-03-26 PROCEDURE — 82306 VITAMIN D 25 HYDROXY: CPT

## 2025-03-26 PROCEDURE — 84550 ASSAY OF BLOOD/URIC ACID: CPT

## (undated) DEVICE — SOLUTION IRRIG 3000ML STRL H2O USP UROMATIC PLAS CONT

## (undated) DEVICE — SPECIMEN CUP W/LID: Brand: DEROYAL

## (undated) DEVICE — CATHETER URET 5FR L70CM OPN END SGL LUMN INJ HUB FLEXIMA

## (undated) DEVICE — 4-PORT MANIFOLD: Brand: NEPTUNE 2

## (undated) DEVICE — GAUZE,SPONGE,4"X4",8PLY,STRL,LF,10/TRAY: Brand: MEDLINE

## (undated) DEVICE — MEDIA CONTRAST ISOVUE  300 10X50ML

## (undated) DEVICE — BAG DRNGE COMB PK

## (undated) DEVICE — HOSE CONN FOR WST MGMT SYS NEPTUNE 2

## (undated) DEVICE — SOLUTION IRRIG 3000ML 0.9% SOD CHL USP UROMATIC PLAS CONT

## (undated) DEVICE — SOLUTION SURG PREP ANTIMICROBIAL 4 OZ SKIN WND EXIDINE

## (undated) DEVICE — CYSTO PACK: Brand: MEDLINE INDUSTRIES, INC.

## (undated) DEVICE — SOLUTION IV IRRIG WATER 1000ML POUR BRL 2F7114

## (undated) DEVICE — GLOVE ORANGE PI 7 1/2   MSG9075

## (undated) DEVICE — GOWN,SIRUS,FABRNF,XL,20/CS: Brand: MEDLINE